# Patient Record
Sex: MALE | Race: WHITE
[De-identification: names, ages, dates, MRNs, and addresses within clinical notes are randomized per-mention and may not be internally consistent; named-entity substitution may affect disease eponyms.]

---

## 2017-01-04 LAB
% IMMATURE GRANULYOCYTES: 0.7 % (ref 0–1.1)
ABSOLUTE IMMATURE GRANULOCYTES: 0.07 10^3/UL (ref 0–0.1)
ABSOLUTE NRBC COUNT: 0 10^3/UL (ref 0–0.01)
ADD DIFF?: NO
ADD MORPH?: NO
ADD SCAN?: NO
ANION GAP SERPL CALC-SCNC: 13 MEQ/L (ref 8–16)
ATYPICAL LYMPHOCYTE FLAG: 0 (ref 0–99)
CALCIUM SERPL-MCNC: 10.2 MG/DL (ref 8.5–10.4)
CHLORIDE SERPL-SCNC: 99 MEQ/L (ref 97–110)
CO2 SERPL-SCNC: 27 MEQ/L (ref 22–31)
CREAT SERPL-MCNC: 0.7 MG/DL (ref 0.7–1.3)
ERYTHROCYTE [DISTWIDTH] IN BLOOD BY AUTOMATED COUNT: 12.9 % (ref 11.5–15.2)
FRAGMENT RBC FLAG: 0 (ref 0–99)
GFR SERPL CREATININE-BSD FRML MDRD: > 60 ML/MIN/{1.73_M2}
GLUCOSE SERPL-MCNC: 106 MG/DL (ref 70–100)
HCT VFR BLD CALC: 50 % (ref 40–51)
HGB BLD-MCNC: 18.3 G/DL (ref 13.7–17.5)
LEFT SHIFT FLG: 0 (ref 0–99)
LIPEMIA HEMOLYSIS FLAG: 90 (ref 0–99)
MCH RBC BLDCO QN: 35.2 PG (ref 27.9–34.1)
MCHC RBC AUTO-ENTMCNC: 36.6 G/DL (ref 32.4–36.7)
MCV RBC AUTO: 96.2 FL (ref 81.5–99.8)
NRBC-AUTO%: 0 % (ref 0–0.2)
PLATELET # BLD: 195 10^3/UL (ref 150–400)
PLATELET CLUMPS FLAG: 10 (ref 0–99)
PMV BLD AUTO: 9.8 FL (ref 8.7–11.7)
POTASSIUM SERPL-SCNC: 3.4 MEQ/L (ref 3.5–5.2)
RBC # BLD AUTO: 5.2 10^6/UL (ref 4.4–6.38)
SODIUM SERPL-SCNC: 139 MEQ/L (ref 134–144)

## 2017-01-05 ENCOUNTER — HOSPITAL ENCOUNTER (OUTPATIENT)
Dept: HOSPITAL 80 - FIMAGING | Age: 57
End: 2017-01-05
Attending: ORTHOPAEDIC SURGERY
Payer: COMMERCIAL

## 2017-01-05 DIAGNOSIS — Z01.818: Primary | ICD-10-CM

## 2017-01-05 DIAGNOSIS — M17.9: ICD-10-CM

## 2017-01-05 NOTE — CT
CT Left Lower Extremity With Attention to the Knee

 

Indication: Pain. Preoperative evaluation for left total knee replacement.

 

Technique: Spiral imaging was obtained through the left knee at 1.25 mm slice thickness along with sp
iral imaging through the left hip and left ankle at 2.5 mm slice thickness. Data was transmitted for 
subsequent SLIME knee replacement.  CT dose reduction techniques utilized.

 

Findings: 

 

Left Hip: Mild osteoarthritic changes.

 

Left Knee: Severe medial joint space narrowing with bone on bone contact and osteophytes. Moderate la
teral joint space narrowing and moderate patellofemoral osteoarthritic changes. There is a Baker cyst
 posteriorly.

 

Left Ankle:  Mild osteoarthritic changes.

 

Impression:

1. Severe medial joint space narrowing left knee and bone on bone changes. Moderate lateral joint spa
ce narrowing left knee and patellofemoral osteoarthritic changes.

2. Data was transmitted for SLIME knee replacement surgery.

## 2017-01-20 ENCOUNTER — HOSPITAL ENCOUNTER (INPATIENT)
Dept: HOSPITAL 80 - F3N | Age: 57
LOS: 1 days | Discharge: HOME | DRG: 470 | End: 2017-01-21
Attending: ORTHOPAEDIC SURGERY | Admitting: ORTHOPAEDIC SURGERY
Payer: COMMERCIAL

## 2017-01-20 DIAGNOSIS — F17.210: ICD-10-CM

## 2017-01-20 DIAGNOSIS — M17.12: Primary | ICD-10-CM

## 2017-01-20 LAB
EST. AVERAGE GLUCOSE BLD GHB EST-MCNC: 123 MG/DL (ref 68–126)
HBA1C MFR BLD: 5.9 % (ref 4–6)

## 2017-01-20 PROCEDURE — 0SRD0J9 REPLACEMENT OF LEFT KNEE JOINT WITH SYNTHETIC SUBSTITUTE, CEMENTED, OPEN APPROACH: ICD-10-PCS | Performed by: ORTHOPAEDIC SURGERY

## 2017-01-20 PROCEDURE — 8E0Y0CZ ROBOTIC ASSISTED PROCEDURE OF LOWER EXTREMITY, OPEN APPROACH: ICD-10-PCS | Performed by: ORTHOPAEDIC SURGERY

## 2017-01-20 PROCEDURE — C1713 ANCHOR/SCREW BN/BN,TIS/BN: HCPCS

## 2017-01-20 RX ADMIN — FAMOTIDINE SCH MG: 20 TABLET, FILM COATED ORAL at 20:07

## 2017-01-20 RX ADMIN — ACETAMINOPHEN SCH MG: 325 TABLET ORAL at 18:27

## 2017-01-20 RX ADMIN — ASPIRIN SCH MG: 325 TABLET, FILM COATED ORAL at 20:07

## 2017-01-20 RX ADMIN — HYOSCYAMINE SULFATE SCH MG: 0.38 TABLET, EXTENDED RELEASE ORAL at 20:07

## 2017-01-20 RX ADMIN — OXYCODONE HYDROCHLORIDE PRN MG: 15 TABLET ORAL at 15:12

## 2017-01-20 RX ADMIN — ACETAMINOPHEN SCH MG: 325 TABLET ORAL at 23:46

## 2017-01-20 RX ADMIN — DOCUSATE SODIUM AND SENNOSIDES SCH TAB: 50; 8.6 TABLET ORAL at 20:07

## 2017-01-20 RX ADMIN — GABAPENTIN SCH MG: 300 CAPSULE ORAL at 20:07

## 2017-01-20 RX ADMIN — OXYCODONE HYDROCHLORIDE PRN MG: 15 TABLET ORAL at 20:08

## 2017-01-20 RX ADMIN — OXYCODONE HYDROCHLORIDE PRN MG: 15 TABLET ORAL at 23:48

## 2017-01-20 RX ADMIN — Medication SCH MLS: at 18:28

## 2017-01-20 NOTE — DX
Left knee, 2 views.



HISTORY: left knee pain. Status post left knee arthroplasty. Post operative evaluation.



FINDINGS: Postsurgical changes as seen of a left total knee arthroplasty. There is good alignment in 
appearance. No evidence for periprosthetic lucency or fracture. There is appropriate soft tissue worrell
ge for the immediate postsurgical appearance.



IMPRESSION: Post surgical changes of left total knee arthroplasty with good alignment and appearance.

## 2017-01-21 VITALS — HEART RATE: 60 BPM | DIASTOLIC BLOOD PRESSURE: 77 MMHG | RESPIRATION RATE: 15 BRPM | SYSTOLIC BLOOD PRESSURE: 117 MMHG

## 2017-01-21 VITALS — OXYGEN SATURATION: 92 %

## 2017-01-21 VITALS — TEMPERATURE: 97.8 F

## 2017-01-21 LAB
ANION GAP SERPL CALC-SCNC: 5 MEQ/L (ref 8–16)
CALCIUM SERPL-MCNC: 8.4 MG/DL (ref 8.5–10.4)
CHLORIDE SERPL-SCNC: 105 MEQ/L (ref 97–110)
CO2 SERPL-SCNC: 27 MEQ/L (ref 22–31)
CREAT SERPL-MCNC: 0.6 MG/DL (ref 0.7–1.3)
GFR SERPL CREATININE-BSD FRML MDRD: > 60 ML/MIN/{1.73_M2}
GLUCOSE SERPL-MCNC: 126 MG/DL (ref 70–100)
HCT VFR BLD CALC: 41.9 % (ref 40–51)
HGB BLD-MCNC: 15 G/DL (ref 13.7–17.5)
POTASSIUM SERPL-SCNC: 4.1 MEQ/L (ref 3.5–5.2)
SODIUM SERPL-SCNC: 137 MEQ/L (ref 134–144)

## 2017-01-21 RX ADMIN — HYOSCYAMINE SULFATE SCH MG: 0.38 TABLET, EXTENDED RELEASE ORAL at 08:45

## 2017-01-21 RX ADMIN — DOCUSATE SODIUM AND SENNOSIDES SCH: 50; 8.6 TABLET ORAL at 08:46

## 2017-01-21 RX ADMIN — FAMOTIDINE SCH MG: 20 TABLET, FILM COATED ORAL at 08:43

## 2017-01-21 RX ADMIN — ATORVASTATIN CALCIUM SCH: 20 TABLET, FILM COATED ORAL at 08:52

## 2017-01-21 RX ADMIN — OXYCODONE HYDROCHLORIDE PRN MG: 15 TABLET ORAL at 08:46

## 2017-01-21 RX ADMIN — OXYCODONE HYDROCHLORIDE PRN MG: 15 TABLET ORAL at 05:11

## 2017-01-21 RX ADMIN — ATORVASTATIN CALCIUM SCH MG: 20 TABLET, FILM COATED ORAL at 08:42

## 2017-01-21 RX ADMIN — Medication SCH MLS: at 02:04

## 2017-01-21 RX ADMIN — GABAPENTIN SCH MG: 300 CAPSULE ORAL at 08:42

## 2017-01-21 RX ADMIN — ACETAMINOPHEN SCH MG: 325 TABLET ORAL at 05:11

## 2017-01-21 RX ADMIN — ASPIRIN SCH MG: 325 TABLET, FILM COATED ORAL at 08:43

## 2017-01-21 NOTE — GOP
[f 
rep st]



                                                                OPERATIVE REPORT





DATE OF OPERATION:  01/20/2017



SURGEON:  ANSHU Bull MD



ASSISTANT:  DEBBIE Gauthier



ANESTHESIA:  Spinal.



PREOPERATIVE DIAGNOSIS:  Left knee osteoarthritis.



POSTOPERATIVE DIAGNOSIS:  Left knee osteoarthritis.



PROCEDURE PERFORMED:  Left total knee replacement with computer navigation with 
robotic assist.



FINDINGS:  Severe medial osteoarthritis.  Preop flexion contracture of 5 
degrees.  



ESTIMATED BLOOD LOSS:  30 cc



INDICATIONS:  This is a 57-year-old male with severe and progressive pain and 
deformity of the left knee unresponsive to conservative care.  Risks and 
benefits of the surgical intervention were explained in detail.



DESCRIPTION OF PROCEDURE:  The patient was brought to the operative room and 
placed on the table in the supine position. Spinal anesthesia was induced 
without difficulty. A pneumatic tourniquet was applied about the left proximal 
thigh, and the leg was prepped and draped in a sterile fashion. The leg tarango 
was applied. After exsanguination by elevation the tourniquet was inflated to 
275 mm of mercury. 



Incision was made anterior medial from the tibial tuberosity to a point 2 cm 
proximal to the superior pole of the patella. Medial parapatellar arthrotomy 
was carried out from the superior pole of the patella and posteriorly in line 
with the fibers of the Type II VMO. The medial collateral ligament was elevated 
and the infrapatellar fat pad was resected. 



The patella was everted and the articular surface was excised. A 35 mm patellar 
button was placed.  



 Attention was turned first to the distal aspect of the left femur.  At 3 cm 
proximal to the medial rise of the femur, 2 percutaneous half pins were placed 
for fixation of the femoral array.  In a similar fashion, 2 pins were placed 
anteromedial on the tibia for fixation of the tibial array.  External land 
marking and registration of the hip center was performed without difficulty.   
Internal femoral and tibial registration was carried out without difficulty and 
the femoral and tibial checkpoints were placed and verified for accuracy. 



Attention was turned to the femur.  The foot print for the size 5 femoral 
component was cut with the saw using the [employer] robotic system and verified for 
accuracy against the CT based plan.   In a similar fashion, saw was used to cut 
the footprint for the size 6 tibial component using the [employer] system and verified 
for accuracy against the CT based plan. 



The tibial articular surface was excised without difficulty, followed by the 
intercondylar box cut. 



The knee was extended and the remnants of the medial and lateral meniscus were 
excised. The posterior capsule was injected with ropivacaine, epinephrine and 
Toradol. A size 6 MIS mini-keel tibial tray was positioned. Trial reduction was 
then carried out. There was excellent range of motion, alignment, and stability 
using the 9 mm polyethylene. 



All trials were then removed. The joint was thoroughly irrigated and carefully 
dried. Two packages of cement and 2 grams of vancomycin were mixed in the 
vacuum mixer and placed on the fixation surfaces of all surfaces of the 
components. The components were implanted and all excess cement was thoroughly 
removed. The permanent 9 mm polyethylene was placed without difficulty. 



The tourniquet was deflated and all bleeders were coagulated. The wound was 
thoroughly irrigated and closed using interrupted sutures of 2-0 Vicryl for the 
joint capsule. The subcu was closed with 3-0 Vicryl and the skin with 4-0 
Monocryl. Dermabond and Steri-Strips were applied followed by a compressive 
dressing. The patient was then moved from the operating room to the recovery 
room in good condition, having tolerated the procedure well.







Job #:  [id number]

MTDD

## 2017-01-21 NOTE — SOAPPROG
SOAP Progress Note


Assessment/Plan: 


Assessment:








Paolo is doing well POD 1 s/p L TKA





1. pain management: pain well controlled on oral pain medications.


2. VTE ppx: recommend  mg daily. cont NAFISA hose


3. Anemia: level is expected initially postop. asymptomatic.


4. d/c planning: d/c to home today.

















Plan:








01/21/17 10:19





Subjective: 


Daljit is doing well today, denies SOB, chest pain and N/V.


Objective: 





 Vital Signs











Temp Pulse Resp BP Pulse Ox


 


 36.6 C   60   15   117/77   92 


 


 01/21/17 04:59  01/21/17 07:31  01/21/17 07:31  01/21/17 07:31  01/21/17 08:47








 Laboratory Results





 01/21/17 04:40 





 01/21/17 04:40 





 











 01/20/17 01/21/17 01/22/17





 05:59 05:59 05:59


 


Intake Total  4560 


 


Output Total  450 


 


Balance  4110 








LLE: incision dressing is clean and dry, NVI, +pf/df





ICD10 Worksheet


Patient Problems: 


 Problems











Problem Status Diagnosed


 


Primary localized osteoarthritis of left knee Acute 


 


Alcohol dependence Active 


 


Diabetes mellitus type 2 Active 


 


Essential hypertension Active 


 


Hyperlipidemia Active

## 2017-01-26 NOTE — GDS
[f rep st]



                                                             DISCHARGE SUMMARY





ADMISSION DIAGNOSIS:  Left knee osteoarthritis.



DISCHARGE DIAGNOSIS:  Left knee osteoarthritis.



PROCEDURE:  Left total knee arthroplasty, robot assisted.



VTE PROPHYLAXIS:  Aspirin recommended for 3 weeks daily.



BRIEF DESCRIPTION OF HOSPITAL STAY:  Patient was admitted for an elective joint arthroplasty.  The pa
tient tolerated the procedure well and has passed physical therapy.  The patient was given appropriat
e antibiotic prophylaxis and venous thromboembolism prophylaxis.  The patient's pain was well control
led on oral pain medication, patient was holding down food, and had urinated.  Decision was made to d
ischarge the patient.  The patient was given post-operative prescriptions pre-operatively.



PLAN:  To follow up with Dr. Bull at [facility] for Orthopedics in 2 to 3 weeks.





Job #:  [id number]

## 2017-03-25 ENCOUNTER — HOSPITAL ENCOUNTER (INPATIENT)
Dept: HOSPITAL 80 - FED | Age: 57
LOS: 2 days | Discharge: HOME | DRG: 440 | End: 2017-03-27
Attending: FAMILY MEDICINE | Admitting: FAMILY MEDICINE
Payer: COMMERCIAL

## 2017-03-25 DIAGNOSIS — E78.5: ICD-10-CM

## 2017-03-25 DIAGNOSIS — E11.9: ICD-10-CM

## 2017-03-25 DIAGNOSIS — D75.1: ICD-10-CM

## 2017-03-25 DIAGNOSIS — Z72.0: ICD-10-CM

## 2017-03-25 DIAGNOSIS — I10: ICD-10-CM

## 2017-03-25 DIAGNOSIS — K85.20: Primary | ICD-10-CM

## 2017-03-25 DIAGNOSIS — G89.29: ICD-10-CM

## 2017-03-25 LAB
% IMMATURE GRANULYOCYTES: 0.9 % (ref 0–1.1)
ABSOLUTE IMMATURE GRANULOCYTES: 0.15 10^3/UL (ref 0–0.1)
ABSOLUTE NRBC COUNT: 0 10^3/UL (ref 0–0.01)
ADD DIFF?: NO
ADD MORPH?: NO
ADD SCAN?: NO
ALBUMIN SERPL-MCNC: 4.9 G/DL (ref 3.5–5)
ALP SERPL-CCNC: 83 IU/L (ref 38–126)
ALT SERPL-CCNC: 54 IU/L (ref 21–72)
ANION GAP SERPL CALC-SCNC: 23 MEQ/L (ref 8–16)
AST SERPL-CCNC: 60 IU/L (ref 17–59)
ATYPICAL LYMPHOCYTE FLAG: 0 (ref 0–99)
BILIRUB SERPL-MCNC: 1.5 MG/DL (ref 0.1–1.4)
BILIRUBIN-CONJUGATED: 0.7 MG/DL (ref 0–0.5)
BILIRUBIN-UNCONJUGATED: 0.8 MG/DL (ref 0–1.1)
CALCIUM SERPL-MCNC: 9.8 MG/DL (ref 8.5–10.4)
CHLORIDE SERPL-SCNC: 96 MEQ/L (ref 97–110)
CO2 SERPL-SCNC: 20 MEQ/L (ref 22–31)
CREAT SERPL-MCNC: 1.1 MG/DL (ref 0.7–1.3)
ERYTHROCYTE [DISTWIDTH] IN BLOOD BY AUTOMATED COUNT: 11.9 % (ref 11.5–15.2)
FRAGMENT RBC FLAG: 0 (ref 0–99)
GFR SERPL CREATININE-BSD FRML MDRD: > 60 ML/MIN/{1.73_M2}
GLUCOSE SERPL-MCNC: 166 MG/DL (ref 70–100)
HCT VFR BLD CALC: 53.8 % (ref 40–51)
HGB BLD-MCNC: 19.6 G/DL (ref 13.7–17.5)
LEFT SHIFT FLG: 0 (ref 0–99)
LIPEMIA HEMOLYSIS FLAG: 90 (ref 0–99)
MCH RBC BLDCO QN: 36.2 PG (ref 27.9–34.1)
MCHC RBC AUTO-ENTMCNC: 36.4 G/DL (ref 32.4–36.7)
MCV RBC AUTO: 99.4 FL (ref 81.5–99.8)
NRBC-AUTO%: 0 % (ref 0–0.2)
PLATELET # BLD: 167 10^3/UL (ref 150–400)
PLATELET CLUMPS FLAG: 0 (ref 0–99)
PMV BLD AUTO: 10.3 FL (ref 8.7–11.7)
POTASSIUM SERPL-SCNC: 3.5 MEQ/L (ref 3.5–5.2)
PROT SERPL-MCNC: 8 G/DL (ref 6.3–8.2)
RBC # BLD AUTO: 5.41 10^6/UL (ref 4.4–6.38)
SODIUM SERPL-SCNC: 139 MEQ/L (ref 134–144)

## 2017-03-25 RX ADMIN — OXYCODONE HYDROCHLORIDE SCH MG: 15 TABLET ORAL at 21:22

## 2017-03-25 RX ADMIN — PANTOPRAZOLE SODIUM SCH MLS: 40 INJECTION, POWDER, FOR SOLUTION INTRAVENOUS at 19:51

## 2017-03-25 RX ADMIN — SODIUM CHLORIDE SCH MLS: 900 INJECTION, SOLUTION INTRAVENOUS at 19:51

## 2017-03-25 RX ADMIN — HYDROMORPHONE HCL-SODIUM CHLORIDE 0.9% INJ 6 MG/30ML PRN MG: 0.2 SOLUTION at 21:04

## 2017-03-25 NOTE — EDPHY
H & P


Stated Complaint: etoh/hx pancreatitis/r upper abd pain/n/v


Time Seen by Provider: 03/25/17 16:04


HPI/ROS: 





CHIEF COMPLAINT:  Abdominal pain, vomiting. 





HISTORY OF PRESENT ILLNESS:  The patient is a 57-year-old male with a history 

of alcohol abuse and pancreatitis who presents with epigastric abdominal pain 

and vomiting x10 that began 7 hours ago. These symptoms are similar to his 

previous pancreatitis episodes. He denies alleviating or provoking factors. The 

pain does not radiate. He admits alcohol use last night and has been drinking 

heavily over the past month. He admits decreased PO intake. No fever, chills, 

chest pain, shortness of breath, palpitations, diarrhea, urinary complaints, 

headache, lightheadedness. His last episode of pancreatitis was 4 years ago. He 

took oxycodone for the pain but vomited it up. 





REVIEW OF SYSTEMS:


Aside from elements discussed in the HPI, a comprehensive 10-point review of 

systems was reviewed and is negative.





PAST MEDICAL HISTORY:   Diabetes, hypertension, hypercholesterolemia, depression

, chronic pain, pancreatitis, left knee replacement. 





SOCIAL HISTORY:  Alcohol abuse, tobacco use, no illicit drug use, sales 

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   





VITAL SIGNS: Reviewed by me


GENERAL: Well-developed, well-nourished, resting comfortably in no respiratory 

distress.


HEENT: Atraumatic. Eyes: No icterus, no injection. Mouth: dry mucous membranes.

  No erythema or lesions. Neck: supple with no adenopathy.


LUNGS: Clear to auscultation bilaterally, no wheezes, rhonchi or rales.


CARDIAC: Regular rate and rhythm, no rubs, murmurs or gallops.


ABDOMEN: Soft, bowel sounds normal, protuberant belly. Exquisite epigastric 

tenderness. No guarding or rebound. Hepatomegaly and splenomegaly.


BACK:  No CVA tenderness.


EXTREMITIES: No trauma. No edema.  Range of motion is normal throughout.


NEURO: Alert and oriented,  grossly nonfocal.  


SKIN: Warm and dry, no rash.


PSYCHIATRIC: Normal mentation, no agitation.





Portions of this note were transcribed by a medical scribe.  I personally 

performed a history, physical exam, medical decision making, and confirmed 

accuracy of information the transcribed note.





Source: Patient


Exam Limitations: No limitations





- Personal History


Current Tetanus/Diphtheria Vaccine: Yes





- Medical/Surgical History


Hx Asthma: No


Hx Chronic Respiratory Disease: No


Hx Diabetes: Yes


Hx Cardiac Disease: No


Hx Renal Disease: No


Hx Cirrhosis: No


Hx Alcoholism: Yes


Hx HIV/AIDS: No


Hx Splenectomy or Spleen Trauma: No


Other PMH: DM, HTN, high cholesterol, depression, chronic pain pancreatitis/etoh





- Social History


Smoking Status: Current every day smoker


Constitutional: 


 Initial Vital Signs











Temperature (C)  36.6 C   03/25/17 15:51


 


Heart Rate  81   03/25/17 15:51


 


Respiratory Rate  18   03/25/17 15:51


 


Blood Pressure  145/89 H  03/25/17 15:51


 


O2 Sat (%)  95   03/25/17 15:51








 











O2 Delivery Mode               Room Air














Allergies/Adverse Reactions: 


 





No Known Allergies Allergy (Verified 03/25/17 15:50)


 








Home Medications: 














 Medication  Instructions  Recorded


 


ARIPiprazole [Abilify 2 mg (*)] 2 mg PO DAILY 12/14/16


 


Ascorbic Acid [Vitamin C 500 mg 500 mg PO DAILY 12/14/16





(*)]  


 


Atorvastatin Calcium [Lipitor 20 20 mg PO DAILY 12/14/16





mg (*)]  


 


Canagliflozin [Invokana] 100 mg PO DAILY 12/14/16


 


Exenatide Microspheres [Bydureon 2 mg SQ TH 12/14/16





Pen]  


 


FLUoxetine [Prozac 20 MG (*)] 40 mg PO DAILY 12/14/16


 


Folic Acid [Folic Acid 1 MG (*)] 1 mg PO DAILY 12/14/16


 


Furosemide [Lasix 20 MG (*)] 20 mg PO DAILY 12/14/16


 


Herbals/Supplements -Info Only 1 ea PO DAILY 12/14/16


 


Hyoscyamine Sulfate [Hyomax-Sr 0.375 mg PO BID 12/14/16





0.375 mg (*)]  


 


Losartan/Hydrochlorothiazide 1 each PO DAILY 12/14/16





[Hyzaar 100-12.5 Tablet]  


 


OLANZapine [ZyPREXA 2.5 mg (*)] 5 mg PO HS 12/14/16


 


Vitamin B Complex [B Complex] 1 each PO DAILY 12/14/16


 


Insulin Detemir [Levemir] 40 unit SQ HS 03/25/17


 


oxyCODONE IR [Oxycodone Ir (*)] 10 mg PO TID 03/25/17














Medical Decision Making


ED Course/Re-evaluation: 





An IV was established and labs ordered. 1L IV saline administered for hydration

, along with 1mg IV Dilaudid for pain, and 4mg IV Zofran for nausea.





WBC elevated at 16.48. [place] [address]. Patient to be admitted for 

pancreatitis. 





1807: Consulted with Dr. Chirinos, hospitalist. He accepts admission. 


Differential Diagnosis: 





After obtaining the patient's history and performing an examination, 

differential diagnosis for this patient's epigastric pain was considered 

included but was not limited to biliary colic, cholecystitis, bowel obstruction

, peptic ulcer disease, pancreatitis, and gastroenteritis.


Consult/Admit Bed Type: Dr. Delonte Chirinos, med surg





- Data Points


Laboratory Results: 


 Laboratory Results





 03/25/17 16:00 





 03/25/17 16:00 





 











  03/25/17 03/25/17 03/25/17





  16:00 16:00 16:00


 


WBC      16.48 10^3/uL H 10^3/uL





     (3.80-9.50) 


 


RBC      5.41 10^6/uL 10^6/uL





     (4.40-6.38) 


 


Hgb      19.6 g/dL H g/dL





     (13.7-17.5) 


 


Hct      53.8 % H %





     (40.0-51.0) 


 


MCV      99.4 fL fL





     (81.5-99.8) 


 


MCH      36.2 pg H pg





     (27.9-34.1) 


 


MCHC      36.4 g/dL g/dL





     (32.4-36.7) 


 


RDW      11.9 % %





     (11.5-15.2) 


 


Plt Count      167 10^3/uL 10^3/uL





     (150-400) 


 


MPV      10.3 fL fL





     (8.7-11.7) 


 


Neut % (Auto)      90.5 % H %





     (39.3-74.2) 


 


Lymph % (Auto)      2.8 % L %





     (15.0-45.0) 


 


Mono % (Auto)      5.4 % %





     (4.5-13.0) 


 


Eos % (Auto)      0.0 % L %





     (0.6-7.6) 


 


Baso % (Auto)      0.4 % %





     (0.3-1.7) 


 


Nucleat RBC Rel Count      0.0 % %





     (0.0-0.2) 


 


Absolute Neuts (auto)      14.91 10^3/uL H 10^3/uL





     (1.70-6.50) 


 


Absolute Lymphs (auto)      0.46 10^3/uL L 10^3/uL





     (1.00-3.00) 


 


Absolute Monos (auto)      0.89 10^3/uL H 10^3/uL





     (0.30-0.80) 


 


Absolute Eos (auto)      0.00 10^3/uL L 10^3/uL





     (0.03-0.40) 


 


Absolute Basos (auto)      0.07 10^3/uL 10^3/uL





     (0.02-0.10) 


 


Absolute Nucleated RBC      0.00 10^3/uL 10^3/uL





     (0-0.01) 


 


Immature Gran %      0.9 % %





     (0.0-1.1) 


 


Immature Gran #      0.15 10^3/uL H 10^3/uL





     (0.00-0.10) 


 


Sodium    139 mEq/L mEq/L  





    (134-144)  


 


Potassium    3.5 mEq/L mEq/L  





    (3.5-5.2)  


 


Chloride    96 mEq/L L mEq/L  





    ()  


 


Carbon Dioxide    20 mEq/l L mEq/l  





    (22-31)  


 


Anion Gap    23 mEq/L H mEq/L  





    (8-16)  


 


BUN    28 mg/dL H mg/dL  





    (7-23)  


 


Creatinine    1.1 mg/dL mg/dL  





    (0.7-1.3)  


 


Estimated GFR    > 60   





    


 


Glucose    166 mg/dL H mg/dL  





    ()  


 


Calcium    9.8 mg/dL mg/dL  





    (8.5-10.4)  


 


Total Bilirubin  1.5 mg/dL H mg/dL    





   (0.1-1.4)   


 


Conjugated Bilirubin  0.7 mg/dL H mg/dL    





   (0.0-0.5)   


 


Unconjugated Bilirubin  0.8 mg/dL mg/dL    





   (0.0-1.1)   


 


AST  60 IU/L H IU/L    





   (17-59)   


 


ALT  54 IU/L IU/L    





   (21-72)   


 


Alkaline Phosphatase  83 IU/L IU/L    





   ()   


 


Total Protein  8.0 g/dL g/dL    





   (6.3-8.2)   


 


Albumin  4.9 g/dL g/dL    





   (3.5-5.0)   


 


Lipase  23358.0 IU/L H IU/L    





   ()   











Medications Given: 


 








Discontinued Medications





Hydromorphone HCl (Dilaudid)  1 mg IVP EDNOW ONE


   Stop: 03/25/17 16:28


   Last Admin: 03/25/17 16:39 Dose:  1 mg


Hydromorphone HCl (Dilaudid)  1 mg IVP EDNOW ONE


   Stop: 03/25/17 17:44


   Last Admin: 03/25/17 17:50 Dose:  1 mg


Hydromorphone HCl (Dilaudid)  0.25 - 0.5 mg IVP Q2HRS PRN


   PRN Reason: Pain, Severe Unable to Take PO


   Stop: 04/04/17 18:35


   Last Admin: 03/25/17 19:37 Dose:  0.5 mg


Sodium Chloride (Ns)  1,000 mls @ 0 mls/hr IV ONCE ONE


   PRN Reason: Wide Open


   Stop: 03/25/17 16:01


   Last Admin: 03/25/17 16:00 Dose:  1,000 mls


Sodium Chloride (Ns)  1,000 mls @ 0 mls/hr IV ONCE ONE


   PRN Reason: Wide Open


   Stop: 03/25/17 17:45


   Last Admin: 03/25/17 17:52 Dose:  1,000 mls


Ondansetron HCl (Zofran)  4 mg IVP EDNOW ONE


   Stop: 03/25/17 16:29


   Last Admin: 03/25/17 16:39 Dose:  4 mg


Ondansetron HCl (Zofran)  4 mg IVP EDNOW ONE


   Stop: 03/25/17 17:45


   Last Admin: 03/25/17 17:52 Dose:  4 mg








Departure





- Departure


Disposition: [facility]


Clinical Impression: 


Pancreatitis


Qualifiers:


 Chronicity: acute Pancreatitis type: alcohol induced Acute pancreatitis 

complication: unspecified Qualified Code(s): K85.20 - Alcohol induced acute 

pancreatitis without necrosis or infection





Abdominal pain


Qualifiers:


 Abdominal location: epigastric Qualified Code(s): R10.13 - Epigastric pain





Condition: Fair


Report Scribed for: Nivia Moore


Report Scribed by: Rafael Pérez


Date of Report: 03/25/17


Time of Report: 16:08

## 2017-03-26 LAB
% IMMATURE GRANULYOCYTES: 0.6 % (ref 0–1.1)
ABSOLUTE IMMATURE GRANULOCYTES: 0.08 10^3/UL (ref 0–0.1)
ABSOLUTE NRBC COUNT: 0 10^3/UL (ref 0–0.01)
ADD DIFF?: NO
ADD MORPH?: NO
ADD SCAN?: NO
ALBUMIN SERPL-MCNC: 3.6 G/DL (ref 3.5–5)
ALP SERPL-CCNC: 54 IU/L (ref 38–126)
ALT SERPL-CCNC: 47 IU/L (ref 21–72)
ANION GAP SERPL CALC-SCNC: 12 MEQ/L (ref 8–16)
AST SERPL-CCNC: 52 IU/L (ref 17–59)
ATYPICAL LYMPHOCYTE FLAG: 0 (ref 0–99)
BILIRUB SERPL-MCNC: 1.3 MG/DL (ref 0.1–1.4)
CALCIUM SERPL-MCNC: 8 MG/DL (ref 8.5–10.4)
CHLORIDE SERPL-SCNC: 107 MEQ/L (ref 97–110)
CO2 SERPL-SCNC: 22 MEQ/L (ref 22–31)
CREAT SERPL-MCNC: 0.7 MG/DL (ref 0.7–1.3)
ERYTHROCYTE [DISTWIDTH] IN BLOOD BY AUTOMATED COUNT: 11.8 % (ref 11.5–15.2)
FRAGMENT RBC FLAG: 0 (ref 0–99)
GFR SERPL CREATININE-BSD FRML MDRD: > 60 ML/MIN/{1.73_M2}
GLUCOSE SERPL-MCNC: 86 MG/DL (ref 70–100)
HCT VFR BLD CALC: 48.3 % (ref 40–51)
HGB BLD-MCNC: 16.9 G/DL (ref 13.7–17.5)
LEFT SHIFT FLG: 0 (ref 0–99)
LIPEMIA HEMOLYSIS FLAG: 90 (ref 0–99)
MCH RBC BLDCO QN: 35.5 PG (ref 27.9–34.1)
MCHC RBC AUTO-ENTMCNC: 35 G/DL (ref 32.4–36.7)
MCV RBC AUTO: 101.5 FL (ref 81.5–99.8)
NRBC-AUTO%: 0 % (ref 0–0.2)
PLATELET # BLD: 104 10^3/UL (ref 150–400)
PLATELET CLUMPS FLAG: 10 (ref 0–99)
PMV BLD AUTO: 10 FL (ref 8.7–11.7)
POTASSIUM SERPL-SCNC: 3.6 MEQ/L (ref 3.5–5.2)
PROT SERPL-MCNC: 6 G/DL (ref 6.3–8.2)
RBC # BLD AUTO: 4.76 10^6/UL (ref 4.4–6.38)
SODIUM SERPL-SCNC: 141 MEQ/L (ref 134–144)

## 2017-03-26 RX ADMIN — ENOXAPARIN SODIUM SCH MG: 100 INJECTION SUBCUTANEOUS at 08:13

## 2017-03-26 RX ADMIN — DEXTROSE MONOHYDRATE, SODIUM CHLORIDE, AND POTASSIUM CHLORIDE SCH MLS: 50; 4.5; 1.49 INJECTION, SOLUTION INTRAVENOUS at 09:55

## 2017-03-26 RX ADMIN — LOSARTAN POTASSIUM SCH MG: 50 TABLET, FILM COATED ORAL at 11:15

## 2017-03-26 RX ADMIN — SODIUM CHLORIDE SCH MLS: 900 INJECTION, SOLUTION INTRAVENOUS at 00:37

## 2017-03-26 RX ADMIN — INSULIN LISPRO SCH: 100 INJECTION, SOLUTION INTRAVENOUS; SUBCUTANEOUS at 12:15

## 2017-03-26 RX ADMIN — PANTOPRAZOLE SODIUM SCH MLS: 40 INJECTION, POWDER, FOR SOLUTION INTRAVENOUS at 08:14

## 2017-03-26 RX ADMIN — INSULIN LISPRO SCH: 100 INJECTION, SOLUTION INTRAVENOUS; SUBCUTANEOUS at 08:00

## 2017-03-26 RX ADMIN — DEXTROSE MONOHYDRATE, SODIUM CHLORIDE, AND POTASSIUM CHLORIDE SCH MLS: 50; 4.5; 1.49 INJECTION, SOLUTION INTRAVENOUS at 18:15

## 2017-03-26 RX ADMIN — HYDROMORPHONE HCL-SODIUM CHLORIDE 0.9% INJ 6 MG/30ML PRN MG: 0.2 SOLUTION at 09:55

## 2017-03-26 RX ADMIN — INSULIN LISPRO SCH: 100 INJECTION, SOLUTION INTRAVENOUS; SUBCUTANEOUS at 18:03

## 2017-03-26 RX ADMIN — OXYCODONE HYDROCHLORIDE SCH MG: 15 TABLET ORAL at 08:13

## 2017-03-26 RX ADMIN — OXYCODONE HYDROCHLORIDE SCH MG: 15 TABLET ORAL at 21:31

## 2017-03-26 RX ADMIN — OXYCODONE HYDROCHLORIDE SCH MG: 15 TABLET ORAL at 15:57

## 2017-03-26 RX ADMIN — HYDROCHLOROTHIAZIDE SCH MG: 12.5 CAPSULE ORAL at 11:16

## 2017-03-26 NOTE — HOSPPROG
Hospitalist Progress Note


Assessment/Plan: 





56 y/o male with h/o etoh abuse presents with





#acute pancreatitis (likely etoh related)


   -decrease ivf from 250ml/hr to d51/2 ns with 20meq kcl


   -cont pca


   -cont npo status given suspected ileus (diminished bowel sounds and absence 

of flatus)





#resolved polycythemia likely from hemoconcentration in the setting of 

dehydration 





#DM2 on insulin with episode of hypoglycemia while npo


   -he was given half of his normal dose of basal insulin last night


   -will hold lantus and cont with correctional insulin





#hypertension and tachycardia query etoh withdrawal


   -resume losartan 


   -start ciwa with librium and monitor for signs of worsening withdrawal





#tobacco use 


   -discussed tobacco cessation especially since it is another known risk for 

pancreatitis


Subjective: no flatus.  improving abd pain.  still feels like he needs the pca.

  no appetite.  no emesis


Objective: 


 Vital Signs











Temp Pulse Resp BP Pulse Ox


 


 37.2 C   97   15   142/87 H  92 


 


 03/26/17 05:48  03/26/17 05:48  03/26/17 05:48  03/26/17 05:48  03/26/17 05:48








 Laboratory Results





 03/26/17 04:45 





 03/26/17 04:45 





 











 03/25/17 03/26/17 03/27/17





 05:59 05:59 05:59


 


Output Total  1000 


 


Balance  -1000 














- Physical Exam


Constitutional: no apparent distress, appears nourished, not in pain


Cardiovascular: tachycardia, edema (trace lower leg), No systolic murmur, No JVD


Respiratory: no respiratory distress, no rales or rhonchi, clear to auscultation

, No inspiratory crackles, No rhonchi


Gastrointestinal: distension, other (soft with diminished bowel sounds), No 

guarding, No rebound


Genitourinary: no bladder fullness, no bladder tenderness, no renal bruits


Neurologic: AAOx3, sensation intact bilaterally


Psychiatric: interacting appropriately, not anxious, not encephalopathic, 

thought process linear





ICD10 Worksheet


Patient Problems: 


 Problems











Problem Status Onset


 


Diabetes mellitus type 2 Active  


 


Hyperlipidemia Active  


 


Essential hypertension Active  


 


Alcohol dependence Active  


 


Primary localized osteoarthritis of left knee Acute  


 


Pancreatitis Acute  


 


Abdominal pain Acute

## 2017-03-27 VITALS — OXYGEN SATURATION: 93 % | SYSTOLIC BLOOD PRESSURE: 126 MMHG | TEMPERATURE: 99.6 F | DIASTOLIC BLOOD PRESSURE: 78 MMHG

## 2017-03-27 VITALS — RESPIRATION RATE: 18 BRPM | HEART RATE: 104 BPM

## 2017-03-27 LAB
% IMMATURE GRANULYOCYTES: 1.3 % (ref 0–1.1)
ABSOLUTE IMMATURE GRANULOCYTES: 0.17 10^3/UL (ref 0–0.1)
ABSOLUTE NRBC COUNT: 0 10^3/UL (ref 0–0.01)
ADD DIFF?: NO
ADD MORPH?: NO
ADD SCAN?: NO
ALBUMIN SERPL-MCNC: 2.8 G/DL (ref 3.5–5)
ALP SERPL-CCNC: 50 IU/L (ref 38–126)
ALT SERPL-CCNC: 42 IU/L (ref 21–72)
ANION GAP SERPL CALC-SCNC: 9 MEQ/L (ref 8–16)
AST SERPL-CCNC: 43 IU/L (ref 17–59)
ATYPICAL LYMPHOCYTE FLAG: 0 (ref 0–99)
BILIRUB SERPL-MCNC: 1.6 MG/DL (ref 0.1–1.4)
CALCIUM SERPL-MCNC: 7.7 MG/DL (ref 8.5–10.4)
CHLORIDE SERPL-SCNC: 104 MEQ/L (ref 97–110)
CO2 SERPL-SCNC: 19 MEQ/L (ref 22–31)
CREAT SERPL-MCNC: 0.7 MG/DL (ref 0.7–1.3)
ERYTHROCYTE [DISTWIDTH] IN BLOOD BY AUTOMATED COUNT: 12 % (ref 11.5–15.2)
FRAGMENT RBC FLAG: 0 (ref 0–99)
GFR SERPL CREATININE-BSD FRML MDRD: > 60 ML/MIN/{1.73_M2}
GLUCOSE SERPL-MCNC: 114 MG/DL (ref 70–100)
HCT VFR BLD CALC: 45.4 % (ref 40–51)
HGB BLD-MCNC: 15.5 G/DL (ref 13.7–17.5)
LEFT SHIFT FLG: 10 (ref 0–99)
LIPEMIA HEMOLYSIS FLAG: 90 (ref 0–99)
MAGNESIUM SERPL-MCNC: 2.2 MG/DL (ref 1.6–2.3)
MCH RBC BLDCO QN: 35.3 PG (ref 27.9–34.1)
MCHC RBC AUTO-ENTMCNC: 34.1 G/DL (ref 32.4–36.7)
MCV RBC AUTO: 103.4 FL (ref 81.5–99.8)
NRBC-AUTO%: 0 % (ref 0–0.2)
PLATELET # BLD: 85 10^3/UL (ref 150–400)
PLATELET CLUMPS FLAG: 20 (ref 0–99)
PMV BLD AUTO: 10.3 FL (ref 8.7–11.7)
POTASSIUM SERPL-SCNC: 3.4 MEQ/L (ref 3.5–5.2)
PROT SERPL-MCNC: 5.3 G/DL (ref 6.3–8.2)
RBC # BLD AUTO: 4.39 10^6/UL (ref 4.4–6.38)
SODIUM SERPL-SCNC: 132 MEQ/L (ref 134–144)

## 2017-03-27 RX ADMIN — DEXTROSE MONOHYDRATE, SODIUM CHLORIDE, AND POTASSIUM CHLORIDE SCH MLS: 50; 4.5; 1.49 INJECTION, SOLUTION INTRAVENOUS at 10:35

## 2017-03-27 RX ADMIN — INSULIN LISPRO SCH: 100 INJECTION, SOLUTION INTRAVENOUS; SUBCUTANEOUS at 09:15

## 2017-03-27 RX ADMIN — LOSARTAN POTASSIUM SCH MG: 50 TABLET, FILM COATED ORAL at 09:15

## 2017-03-27 RX ADMIN — HYDROCHLOROTHIAZIDE SCH MG: 12.5 CAPSULE ORAL at 09:13

## 2017-03-27 RX ADMIN — DEXTROSE MONOHYDRATE, SODIUM CHLORIDE, AND POTASSIUM CHLORIDE SCH MLS: 50; 4.5; 1.49 INJECTION, SOLUTION INTRAVENOUS at 01:41

## 2017-03-27 RX ADMIN — OXYCODONE HYDROCHLORIDE SCH MG: 15 TABLET ORAL at 09:12

## 2017-03-27 RX ADMIN — INSULIN LISPRO SCH UNITS: 100 INJECTION, SOLUTION INTRAVENOUS; SUBCUTANEOUS at 12:45

## 2017-03-27 RX ADMIN — HYDROMORPHONE HCL-SODIUM CHLORIDE 0.9% INJ 6 MG/30ML PRN MG: 0.2 SOLUTION at 01:35

## 2017-03-27 RX ADMIN — ENOXAPARIN SODIUM SCH MG: 100 INJECTION SUBCUTANEOUS at 09:16

## 2017-03-27 RX ADMIN — PANTOPRAZOLE SODIUM SCH MLS: 40 INJECTION, POWDER, FOR SOLUTION INTRAVENOUS at 09:10

## 2017-03-27 NOTE — GDS
[f rep st]



                                                             DISCHARGE SUMMARY





DISCHARGE DIAGNOSES:  

1.  Acute pancreatitis, recurrent, likely secondary to alcohol. 

2.  Type 2 diabetes.  

3.  Hypertension. 

4.  Chronic pain on chronic narcotics.

5.  Dyslipidemia.

6.  Hypertension.



HOSPITAL COURSE:  Please see admission history and physical by Dr. Delonte Chirinos.  The patient presente
d with abdominal pain, previous episodes of pancreatitis.  He was noted to have an elevated lipase i
n the setting of heavy alcohol use.  The patient is managed with n.p.o. and IV fluids.  On the 2nd h
ospital day, his diet was advanced which he tolerated without increasing abdominal pain or nausea. 



His CIWA score was 2 at the time of discharge.



DISCHARGE MEDICATIONS:  He is discharged home on an unchanged medication regimen which is available 
in the electronic health record.





Job #:  [id number]

## 2017-03-27 NOTE — HOSPPROG
Hospitalist Progress Note


Assessment/Plan: 








58 y/o male with h/o etoh abuse a/w acute pancreatitis





acute pancreatitis (likely etoh related)


   ate


   dc IV pain meds and IVF


   trial of lunch





resolved polycythemia likely from hemoconcentration in the setting of 

dehydration 





DM2 on insulin with episode of hypoglycemia while npo


   -he was given half of his normal dose of basal insulin last night


   -will hold lantus and cont with correctional insulin





hypertension and tachycardia query etoh withdrawal


   -resume losartan 


   -start ciwa with librium and monitor for signs of worsening withdrawal





tachycardia: mild


   follow





tobacco use 


   -discussed tobacco cessation especially since it is another known risk for 

pancreatitis





dispo: home if tolerates lunch


   > 30 minutes on dc


Subjective: ate w minimal to no pain. hungry


Objective: 


 Vital Signs











Temp Pulse Resp BP Pulse Ox


 


 36.3 C   104 H  18   136/77 H  92 


 


 03/27/17 08:00  03/27/17 08:00  03/27/17 08:00  03/27/17 08:00  03/27/17 08:00








 Laboratory Results





 03/27/17 04:01 





 03/27/17 04:01 





 











 03/26/17 03/27/17 03/28/17





 05:59 05:59 05:59


 


Intake Total  3673 500


 


Output Total 1000 2600 200


 


Balance -1000 1073 300














- Physical Exam


Constitutional: no apparent distress, appears nourished


Eyes: PERRL, anicteric sclera


Ears, Nose, Mouth, Throat: moist mucous membranes, hearing normal


Cardiovascular: regular rate and rhythym, no murmur, rub, or gallop, tachycardia


Respiratory: no respiratory distress, no rales or rhonchi


Gastrointestinal: normoactive bowel sounds, soft, non-tender abdomen, No 

guarding, No rebound


Genitourinary: No pyle in urethra


Skin: warm, normal color


Musculoskeletal: full muscle strength


Neurologic: AAOx3





ICD10 Worksheet


Patient Problems: 


 Problems











Problem Status Onset


 


Abdominal pain Acute  


 


Pancreatitis Acute  


 


Alcohol dependence Active  


 


Diabetes mellitus type 2 Active  


 


Essential hypertension Active  


 


Hyperlipidemia Active  


 


Primary localized osteoarthritis of left knee Acute

## 2017-03-30 NOTE — EDPHY
H & P


Time Seen by Provider: 03/30/17 15:40


HPI/ROS: 


CHIEF COMPLAINT: Cough





HISTORY OF PRESENT ILLNESS: This patient is a 57 year old diabetic male who 

presents to the Emergency Department complaining of dry cough and shortness of 

breath beginning three days prior to arrival and worsening over time. He 

describes his cough as exacerbated with exertion and when supine. He also 

complains of low appetite, nausea, and diffuse body aches. He reports a 

subjective fever but denies chills. He did receive a flu shot in October. 

Medical history includes alcoholic pancreatitis for which he was recently 

admitted on 3/25 and discharged home on 3/27. He does describe mild lower 

abdominal pain but states that this is secondary to coughing and feels that it 

is dissimilar to his pancreatitis symptoms.





REVIEW OF SYSTEMS:


Constitutional: +subjective fever, no chills


Eyes: No visual changes


ENT: No sore throat


Respiratory: +cough, +shortness of breath


Cardiac: No chest pain


Gastrointestinal: +nausea, no vomiting, no abdominal pain


Genitourinary: No hematuria, no dysuria


Musculoskeletal: +bilateral leg swelling at baseline


Skin: No rash


Neurological: No headache, no numbness, no weakness


Psychiatric: No depression





Past Medical/Surgical History: 


Insulin-dependent diabetes, hypertension, hyperlipidemia, alcoholic 

pancreatitis.





PCP: Dr. Darci Oviedo


Social History: 


Smokes daily. Works at [employer].


Smoking Status: Current every day smoker


Physical Exam: 


General Appearance: Alert, non-toxic


Eyes: Pupils equal and round, no conjunctival pallor or injection


ENT, Mouth: Mucous membranes moist


Neck: Normal inspection


Respiratory: Lungs are clear to auscultation


Cardiovascular: Regular tachycardia


Gastrointestinal:  Abdomen is distended and non-tender 


Neurological:  A&O, nonfocal, normal gait


Skin:  Warm and dry, no rash


Extremities:  Nontender, 2+ pedal edema


Psychiatric: Mood and affect normal


Constitutional: 


 Initial Vital Signs











Temperature (C)  36.7 C   03/30/17 15:31


 


Heart Rate  111 H  03/30/17 15:31


 


Respiratory Rate  20   03/30/17 15:31


 


Blood Pressure  129/72 H  03/30/17 15:31


 


O2 Sat (%)  81 L  03/30/17 15:31








 











O2 Delivery Mode               Nasal Cannula


 


O2 (L/minute)                  4














Allergies/Adverse Reactions: 


 





No Known Allergies Allergy (Verified 03/25/17 15:50)


 








Home Medications: 














 Medication  Instructions  Recorded


 


ARIPiprazole [Abilify 2 mg (*)] 2 mg PO DAILY 12/14/16


 


Ascorbic Acid [Vitamin C 500 mg 500 mg PO DAILY 12/14/16





(*)]  


 


Atorvastatin Calcium [Lipitor 20 20 mg PO DAILY 12/14/16





mg (*)]  


 


Canagliflozin [Invokana] 100 mg PO DAILY 12/14/16


 


Exenatide Microspheres [Bydureon 2 mg SQ TH 12/14/16





Pen]  


 


FLUoxetine [Prozac 20 MG (*)] 40 mg PO DAILY 12/14/16


 


Folic Acid [Folic Acid 1 MG (*)] 1 mg PO DAILY 12/14/16


 


Furosemide [Lasix 20 MG (*)] 20 mg PO DAILY 12/14/16


 


Herbals/Supplements -Info Only 1 ea PO DAILY 12/14/16


 


Hyoscyamine Sulfate [Hyomax-Sr 0.375 mg PO BID 12/14/16





0.375 mg (*)]  


 


Losartan/Hydrochlorothiazide 1 each PO DAILY 12/14/16





[Hyzaar 100-12.5 Tablet]  


 


OLANZapine [ZyPREXA 2.5 mg (*)] 5 mg PO HS 12/14/16


 


Vitamin B Complex [B Complex] 1 each PO DAILY 12/14/16


 


Insulin Detemir [Levemir] 40 unit SQ HS 03/25/17


 


oxyCODONE IR [Oxycodone Ir (*)] 10 mg PO TID 03/25/17














Medical Decision Making





- Diagnostics


Imaging: 


Study: X-ray of the chest


Indication: Cough, shortness of breath, hypoxemia


Results: Chest x-ray was obtained. The results of the study are:


1. Suspect airways disease which is more prominent than on the previous study. 

Clinical correlation recommended to exclude pneumonia. 


2. Mild cardiac enlargement without pulmonary edema. 


The study was read by the radiologist, Dr. Joaquin Erwin. I viewed the images 

myself on the PACS system.








Study: CTA of the chest


Indication: Elevated d-dimer, hypoxemia


Results: CT angiogram of the chest was obtained. The results of the study are: 


1. Negative for pulmonary embolus. 


2. Suspect bilateral pneumonia. Pulmonary edema remains in the differential 

diagnosis. 


3. Cardiomegaly with coronary artery disease, but without plethoric pulmonary 

vessels or pleural effusion.


The study was read by the radiologist, Dr. David Oppenheimer. I viewed the 

images myself on the PACS system.





ED Course/Re-evaluation: 


57-year-old diabetic male presents with acute onset cough and shortness of 

breath with multiple associated complaints suspicious of infectious process 

including body aches, fatigue, nausea, and subjective fever. He is afebrile on 

arrival but tachycardic at 111 and hypoxemic at 81% on room air. Lungs are 

clear to auscultation on exam. Patient is placed on oxygen in the Emergency 

Department. IV established. Will proceed with chest x-ray and labs including 

flu screen and lactate.





Labs obtained: Notably, the patient is hypokalemic at 2.6 as compared to 3.4 on 

3/27. He does take Lasix without potassium supplementation. Lactate is within 

normal range at 1.4. WBC elevated at 10.87.





1645: IV and PO Potassium administered. No fluids will be administered at this 

time.





Chest x-ray demonstrates cardiomegaly. D-dimer, troponin, and BNP ordered. 

Chest x-ray is also suggestive of pneumonia versus airways disease; will 

administer DuoNeb.





Additional labs obtained: BNP elevated at 1210. Troponin is negative. D-dimer 

is elevated at 7.37. Will proceed with CTA of the chest.





1649: Consultation with Dr. Sienna Mills, hospitalist, who accepts admission 

to med/surg.  





1745: CT results reported to me by Dr. Oppenheimer, radiology. Echocardiogram 

ordered. Acute bronchitis/pneumonia more likely than pulmonary edema.  Dr. Mills to order Cefepime IV for HCAP.  Will proceed with admission.  


Differential Diagnosis: 





Differential diagnosis includes though it is not limited to pneumonia, 

pneumothorax, pulmonary embolism, aortic dissection, pericarditis, acute 

coronary syndrome.








- Data Points


Laboratory Results: 


 Laboratory Results





 03/30/17 16:00 





 03/30/17 16:00 





 











  03/30/17 03/30/17 03/30/17





  16:20 16:00 16:00


 


WBC      





    


 


RBC      





    


 


Hgb      





    


 


Hct      





    


 


MCV      





    


 


MCH      





    


 


MCHC      





    


 


RDW      





    


 


Plt Count      





    


 


MPV      





    


 


Neut % (Auto)      





    


 


Lymph % (Auto)      





    


 


Mono % (Auto)      





    


 


Eos % (Auto)      





    


 


Baso % (Auto)      





    


 


Nucleat RBC Rel Count      





    


 


Absolute Neuts (auto)      





    


 


Absolute Lymphs (auto)      





    


 


Absolute Monos (auto)      





    


 


Absolute Eos (auto)      





    


 


Absolute Basos (auto)      





    


 


Absolute Nucleated RBC      





    


 


Immature Gran %      





    


 


Immature Gran #      





    


 


D-Dimer    7.37 ug/mLFEU H ug/mLFEU  





    (0.00-0.50)  


 


VBG Lactic Acid      1.4 mmol/L mmol/L





     (0.7-2.1) 


 


Sodium      





    


 


Potassium      





    


 


Chloride      





    


 


Carbon Dioxide      





    


 


Anion Gap      





    


 


BUN      





    


 


Creatinine      





    


 


Estimated GFR      





    


 


Glucose      





    


 


Calcium      





    


 


Troponin I      





    


 


NT-Pro-B Natriuret Pep      





    


 


Influenza A & B (PCR)  NEGATIVE FOR FLU     





   (NEGATIVE)   














  03/30/17 03/30/17





  16:00 16:00


 


WBC    10.87 10^3/uL H 10^3/uL





    (3.80-9.50) 


 


RBC    4.54 10^6/uL 10^6/uL





    (4.40-6.38) 


 


Hgb    16.2 g/dL g/dL





    (13.7-17.5) 


 


Hct    44.3 % %





    (40.0-51.0) 


 


MCV    97.6 fL fL





    (81.5-99.8) 


 


MCH    35.7 pg H pg





    (27.9-34.1) 


 


MCHC    36.6 g/dL g/dL





    (32.4-36.7) 


 


RDW    12.0 % %





    (11.5-15.2) 


 


Plt Count    168 10^3/uL 10^3/uL





    (150-400) 


 


MPV    10.3 fL fL





    (8.7-11.7) 


 


Neut % (Auto)    84.1 % H %





    (39.3-74.2) 


 


Lymph % (Auto)    5.2 % L %





    (15.0-45.0) 


 


Mono % (Auto)    8.9 % %





    (4.5-13.0) 


 


Eos % (Auto)    0.0 % L %





    (0.6-7.6) 


 


Baso % (Auto)    0.6 % %





    (0.3-1.7) 


 


Nucleat RBC Rel Count    0.0 % %





    (0.0-0.2) 


 


Absolute Neuts (auto)    9.14 10^3/uL H 10^3/uL





    (1.70-6.50) 


 


Absolute Lymphs (auto)    0.56 10^3/uL L 10^3/uL





    (1.00-3.00) 


 


Absolute Monos (auto)    0.97 10^3/uL H 10^3/uL





    (0.30-0.80) 


 


Absolute Eos (auto)    0.00 10^3/uL L 10^3/uL





    (0.03-0.40) 


 


Absolute Basos (auto)    0.07 10^3/uL 10^3/uL





    (0.02-0.10) 


 


Absolute Nucleated RBC    0.00 10^3/uL 10^3/uL





    (0-0.01) 


 


Immature Gran %    1.2 % H %





    (0.0-1.1) 


 


Immature Gran #    0.13 10^3/uL H 10^3/uL





    (0.00-0.10) 


 


D-Dimer    





   


 


VBG Lactic Acid    





   


 


Sodium  136 mEq/L mEq/L  





   (134-144)  


 


Potassium  2.6 mEq/L L* mEq/L  





   (3.5-5.2)  


 


Chloride  99 mEq/L mEq/L  





   ()  


 


Carbon Dioxide  24 mEq/l mEq/l  





   (22-31)  


 


Anion Gap  13 mEq/L mEq/L  





   (8-16)  


 


BUN  21 mg/dL mg/dL  





   (7-23)  


 


Creatinine  0.8 mg/dL mg/dL  





   (0.7-1.3)  


 


Estimated GFR  > 60   





   


 


Glucose  176 mg/dL H mg/dL  





   ()  


 


Calcium  8.7 mg/dL mg/dL  





   (8.5-10.4)  


 


Troponin I  0.014 ng/mL ng/mL  





   (0-0.034)  


 


NT-Pro-B Natriuret Pep  1210 pg/mL H pg/mL  





   (0-125)  


 


Influenza A & B (PCR)    





   











Medications Given: 


 








Discontinued Medications





Albuterol/Ipratropium (Duoneb)  3 ml IH EDNOW ONE


   Stop: 03/30/17 16:45


   Last Admin: 03/30/17 17:32 Dose:  3 ml


Potassium Chloride (Potassium Cl 10 Meq (Premix))  50 mls @ 50 mls/hr IV EDNOW 

ONE


   Stop: 03/30/17 17:40


   Last Admin: 03/30/17 17:19 Dose:  100 mls


Potassium Chloride (Klor-Con)  20 meq PO EDNOW ONE


   Stop: 03/30/17 16:43


   Last Admin: 03/30/17 16:55 Dose:  20 meq








Departure





- Departure


Disposition: [facility]


Clinical Impression: 


 Hypokalemia, Hypoxemia





Pneumonia


Qualifiers:


 Pneumonia type: due to unspecified organism Laterality: bilateral Lung location

: unspecified part of lung Qualified Code(s): J18.9 - Pneumonia, unspecified 

organism





Condition: Fair


Report Scribed for: Betty Landin


Report Scribed by: Ivette Porter


Date of Report: 03/30/17


Time of Report: 16:02


Physician Review and Approval Statement: 





03/30/17 16:02


Portions of this note were transcribed by a medical scribe.  I personally 

performed a history, physical exam, medical decision making, and confirmed 

accuracy of information the transcribed note.

## 2017-03-30 NOTE — GHP
[f rep st]



                                                            HISTORY AND PHYSICAL





DATE OF ADMISSION:  03/30/2017



CHIEF COMPLAINT:  Shortness of breath.



HISTORY OF PRESENT ILLNESS:  A 57-year-old male with a history of alcohol-related pancreatitis who w
as hospitalized on 03/25/2017 to 03/27/2017 for medical management of pancreatitis.  Patient reports
 returning home and feeling well for approximately 24 hours when he developed myalgias, arthralgias,
 subjective fevers, chills, shortness of breath and a cough that is relenting and nonproductive.  Th
e patient had missed several days of work due to his preceding hospitalization; therefore, forced hi
mself to go to work, but found he was having difficulty completing even the simplest tasks at his [employer] at [employer].  He left work early today to return to the hospital for evaluation. 



In the emergency department he is endorsing severe shortness of breath, cough that is nonproductive,
 abdominal discomfort with his cough, but no clear pleuritic chest pain.  Denies hemoptysis.  Report
s myalgia, subjective fevers, mild headache.  No vision changes.  No diarrhea.  No dysuria.  No hema
turia or melena.  No lower extremity edema or new rashes.  No known sick contacts.



PAST MEDICAL HISTORY:  

1.  Alcohol-related pancreatitis.

2.  Type 2 diabetes.

3.  Hypertension.

4.  Chronic pain with continuous narcotic dependency.

5.  Hyperlipidemia.



SOCIAL HISTORY:  The patient smokes three-quarters of a pack cigarettes a day.  He said he has only 
had one drink since his discharge from the hospital.  Denies illicit drugs or marijuana.



FAMILY HISTORY:  Negative for lung disease.



ADVANCED DIRECTIVES:  Patient is full cor, full tube.  He would want his roommate to be his medical 
decision maker.



REVIEW OF SYSTEMS:  A 10-point review of systems is negative with the exception of that reported in 
the HPI.



PHYSICAL EXAMINATION:  VITAL SIGNS:  Blood pressure 129/72, heart rate 111, respiratory rate 20, 81%
 on room air at __________ .  GENERAL:  This is an obese male sitting up in bed.  HEENT:  Notable fo
r dry mucous membranes.  Eye exam is negative for any icterus.  CARDIAC:  Patient is tachycardic, bu
t regular.  PULMONARY:  The patient has adequate pulmonary effort.  No rales, rhonchi or wheezing ar
e appreciated.  GASTROINTESTINAL:  The patient is obese, has positive bowel sounds.  ABDOMEN:  Soft.
  He is nontender to palpation all 4 quadrants.  MUSCULOSKELETAL:  Negative for any lower extremity 
edema.  SKIN:  Exam is negative for any rashes.  NEUROLOGIC:  The patient has no asterixis.  Is aler
t and oriented x3.  Pleasant and cooperative on interview and examination.



DATA:  White count 10.8, hematocrit is 44.3, platelet count of 168, up from 85 at discharge.  Potass
ium is 2.6, creatinine 0.8, glucose 176.  Troponin 0.014.  BNP is 1210. 



Chest x-ray, which I personally reviewed and interpreted, shows no clear lobular infiltrate.  Radiol
bhargav comments on more prominent airways disease and cardiac enlargement without pulmonary edema.



ASSESSMENT AND PLAN:  This is a 57-year-old male presenting with shortness of breath.

1.  Acute hypoxic respiratory failure.  Based on the patient's clustering of symptoms, myalgias, sub
jective fevers, coughs, suspect likely an infectious etiology, probably atypical or viral on the fac
t that there was no lobular infiltrate on chest x-ray.  However, with recent hospitalization he is c
ertainly at risk for healthcare-associated pneumonia.  Will check influenza.  Check blood cultures. 
 Check a D-dimer to rule out possible pulmonary embolism.  Based on the results of these findings, c
onsider empiric antibiotic coverage likely for HCAP initially until we see his clinical response.

2.  Sinus tachycardia.  We will fluid resuscitate as I suspect it is likely related to his acute ill
ness; however, could be PE again.  Will follow D-dimer results.

3.  History of alcohol abuse.  Patient denies any heavy recent use since disposition.  Can monitor f
or withdrawal symptoms.

4.  Diabetes.  He is mildly hyperglycemic at presentation.  Again, may be acutely infected.  Will co
ana maría with sliding scale insulin and continue his home medications when reconciled.

5.  Hypokalemia.  Suspect related to poor oral intake post discharge.  Will aggressively resuscitate
.

6.  Hypertension.  If becoming septic, certainly would be at risk for drop in blood pressures.  Will
 monitor closely on home medications and fluid resuscitation.

7.  Hyperlipidemia.  We will continue his outpatient medications.  

8.  Prophylaxis with Lovenox. 

9.  Diet:  Diabetic.



DISPOSITION:  I expect greater than 2 midnights as the patient is presenting with a new medical comp
laint requiring diagnostics and treatment.  



I have discussed the case with the emergency room physician.  Patient will be triaged to the medical
-surgical floor for diagnostics and care.





Job #:  [id number]

## 2017-03-30 NOTE — CPEKG
Heart Rate: 89

RR Interval: 674

P-R Interval: 148

QRSD Interval: 100

QT Interval: 424

QTC Interval: 516

P Axis: 49

QRS Axis: 15

T Wave Axis: -13

EKG Severity - ABNORMAL ECG -

EKG Impression: SINUS RHYTHM

EKG Impression: NONSPECIFIC T ABNORMALITIES, INFERIOR LEADS

EKG Impression: PROLONGED QT INTERVAL

Electronically Signed By: Betty Landin 30-Mar-2017 20:55:29

## 2017-03-31 NOTE — HOSPPROG
Hospitalist Progress Note


Assessment/Plan: 





# acute hypoxic resp failure - much worse overnight; suspect d/t pna/COPD 

exacerbation


   - transfer to SDU


# COPD exacerbation


   - cont abx, steroids, BDs, mucinex


# HCAP - cont vanc/cefepime, agree with azith addition


   - check viral PCR and sputum cx


# etOH use - has significancy reduced recently; no e/o withdrawal currently


# recent etOH pancreatitis


# DM2 - cont glargine, follow on steroids


# chronic pain on continuous narcotics





##


45 minutes of floor critical care time spent with acute resp failure








Subjective: resp status much worse overnight


Objective: 


 Vital Signs











Temp Pulse Resp BP Pulse Ox


 


 36.6 C   82   36 H  145/85 H  94 


 


 03/31/17 07:21  03/31/17 07:21  03/31/17 08:26  03/31/17 07:21  03/31/17 08:26








 Laboratory Results





 03/31/17 04:17 





 03/31/17 04:17 





 











 03/30/17 03/31/17 04/01/17





 05:59 05:59 05:59


 


Intake Total  200 


 


Output Total   300


 


Balance  200 -300














- Physical Exam


Constitutional: uncomfortable


Cardiovascular: regular rate and rhythym, no murmur, rub, or gallop


Respiratory: reduced air movement, bronchial breath sounds (R base), 

respiratory distress (moderate), other (tachypneic; decreased bilat BS in bases)

, No expiratory wheeze


Gastrointestinal: normoactive bowel sounds, soft, non-tender abdomen, no 

palpable masses





ICD10 Worksheet


Patient Problems: 


 Problems











Problem Status Onset


 


Diabetes mellitus type 2 Active  


 


Hyperlipidemia Active  


 


Essential hypertension Active  


 


Alcohol dependence Active  


 


Primary localized osteoarthritis of left knee Acute  


 


Pancreatitis Acute  


 


Abdominal pain Acute  


 


Hypokalemia Acute  


 


Hypoxemia Acute  


 


Pneumonia Acute

## 2017-03-31 NOTE — ECHO
[id number]

[id number]



+---------------------------------------------------+      [address]

:                                                   :       [place] [address]

:                                                   :           [phone]

+---------------------------------------------------+       Fax [phone]



                       Adult Echocardiographic Report



+----------------------------------------------------------------------------

--------+

:Name: FELICIANO ST PStudy Date: 2017 09:57 AM                     

        :

:MRN: [id number]         Hospital Admission Number: [id number] Locat

ion: [id number]:

:: 1960         Gender: Male                           Height: 70 in

        :

:Age: 57 yrs             Race: WH                               Weight: 210 l

b       :

:Reason For Study: Eval LV Fx                                                

        :

:                                                               BSA: 2.1 mete

rs2     :

:History: Hypoxia                                                            

        :

+----------------------------------------------------------------------------

--------+

MMode/2D Measurements \T\ Calculations

IVSd: 0.98 cm  RVDd: 4.7 cm   FS: 45.6 %             Ao root diam: 3.3 cm

LVPWd: 1.1 cm  LVIDd: 5.3 cm  EDV(Teich): 136.8 ml   ACS: 1.9 cm

               LVIDs: 2.9 cm  ESV(Teich): 32.1 ml

                              EF(Teich): 76.6 %



Normal Measurement Values:

+----------------------------------------------------------------------------

----------------------------------+

:LVIDd (3.5-5.7cm)    IVSd (0.6-1.1cm)     LVPWd (0.6-1.1cm)     Aortic Root 

(2.0-3.7cm)Left Atrium (1.5-4.0cm):

:LV Vol(d) (76-115ml) LV Vol(s) (29-48ml)  Ejec Fraction (50-65%)PV Hitesh (0.6-

1.2m/s)    TV Hitesh (0.4-1.0m/s)    :

:MV E Hitesh (0.8-1.0m/s)MV A Hitesh (0.3-1.0m/s)LVOT Hitesh (0.7-1.2m/s) Asc Ao Hitesh (

0.9-1.8m/s)                       :

+----------------------------------------------------------------------------

----------------------------------+

Doppler Measurements \T\ Calculations

MV E max hitesh:       Ao V2 max:         LV V1 max:         PA V2 max:

84.9 cm/sec         159.9 cm/sec       102.7 cm/sec       89.0 cm/sec

MV A max hitesh:       Ao max PG:         LV V1 max PG:      PA max P.1 cm/sec         10.2 mmHg          4.2 mmHg           3.2 mmHg

MV E/A: 1.2

        _____________________________________________________________



TR max hitesh:

321.2 cm/sec

TR max P.3 mmHg

RAP systole:

5.0 mmHg

RVSP(TR): 46.3 mmHg



Left Ventricle

The left ventricle is normal in size. There is normal left ventricular wall

thickness. The left ventricular ejection fraction is normal. There is

Doppler evidence for diastolic dysfunction. Ejection Fraction = 77%. The

left ventricular wall motion is normal.





Right Ventricle

The right ventricle is mildly dilated.



Atria

The left atrial size is normal. Right atrial size is normal.





Mitral Valve

The mitral valve is normal in structure and function. There is no evidence

of mitral valve prolapse. There is no mitral valve stenosis. There is trace

mitral regurgitation.



Tricuspid Valve

Normal tricuspid valve. There is trace to mild tricuspid regurgitation.

Right ventricular systolic pressure is 47mmHg. There is Doppler evidence for

mild to moderate pulmonary hypertension.



Aortic Valve

The aortic valve is normal in structure and function. There is no aortic

stenosis. There is no aortic insufficiency.



Pulmonic Valve

The pulmonic valve is normal in structure and function. There is no pulmonic

valvular regurgitation.



Great Vessels

The aortic root is normal size.





Pericardium/Pleural

There is no pericardial effusion.



Conclusion

A complete two-dimensional transthoracic echocardiogram was performed (2D,

M-mode, Doppler and color flow Doppler).

The left ventricular ejection fraction is normal.

There is Doppler evidence for diastolic dysfunction.

Ejection Fraction = 77%.

The left ventricular wall motion is normal.

The mitral valve is normal in structure and function.

There is trace mitral regurgitation.

There is trace to mild tricuspid regurgitation.

Right ventricular systolic pressure is 47mmHg.

The aortic valve is normal in structure and function.

There is no pericardial effusion.

There is Doppler evidence for mild to moderate pulmonary hypertension.



_____________________________________________________________________________







Final Reading Physician:

                        Jena Holt signed on 2017 12:21 PM

Ordering Physician: FACUNDO GUPTA

Performed By: Yan Oliveira, CS

## 2017-03-31 NOTE — HOSPPROG
Hospitalist Progress Note


Assessment/Plan: 


  





Critical care spent with patient and nurse at bedside, addressing the following 

issues:





- sepsis screen triggered with worsening tachypnea and tachycardia as well as 

suspected multifocal pneumonia on chest CT without any evidence of pulmonary 

embolism


- oxygen requirements increased from 2 L to 6 L and respiratory rate currently 

40 breaths per minute, patient visibly sweaty, poor inspiratory air movement, 

poor expiratory air movement without overt wheezes or bronchial breath sounds, 

rhonchi present when patient is able to take a deep breath, patient is 

currently mentating well


- reviewed Dr. Mills's history and physical, patient's labs, patient's vital 

signs


- I suspect the patient has a significant multifocal pneumonia and he should be 

covered for atypical organisms, will initiate azithromycin IV, continue him on 

his previously dosed vancomycin and cefepime


- patient reports that he is a smoker and he is at risk for reactive airway 

process sees, unable to get a very good expiratory air movement exam, will 

empirically dose high-dose IV steroids and continue Solu-Medrol 60 mg q.6 hours 

IV as well as scheduled duo nebs


- ABG obtained, no evidence of carbon dioxide retention


- arterial lactate obtained, currently normal, troponin obtained, currently 

normal


- provide him with Tessalon Perles as needed


- send sputum culture, send urine Legionella, send urine strep


- monitor respiratory status closely and if his oxygen requirements continued 

to rise or he has worsening tachypnea, will consider high-flow Vapotherm versus 

BiPAP





Objective: 


 Vital Signs











Temp Pulse Resp BP Pulse Ox


 


 36.7 C   84   36 H  136/80 H  95 


 


 03/30/17 22:58  03/31/17 01:22  03/31/17 01:22  03/30/17 22:58  03/31/17 01:22














ICD10 Worksheet


Patient Problems: 


 Problems











Problem Status Onset


 


Diabetes mellitus type 2 Active  


 


Hyperlipidemia Active  


 


Essential hypertension Active  


 


Alcohol dependence Active  


 


Primary localized osteoarthritis of left knee Acute  


 


Pancreatitis Acute  


 


Abdominal pain Acute  


 


Hypokalemia Acute  


 


Hypoxemia Acute  


 


Pneumonia Acute

## 2017-04-01 NOTE — HOSPPROG
Hospitalist Progress Note


Assessment/Plan: 








# acute hypoxic resp failure - stable overnight, still very high O2 requirements


# COPD exacerbation


   - cont abx, steroids, BDs, mucinex


# HCAP - cont vanc (GPC in sputum)/cefepime, agree with azith addition


   - check viral PCR


   - recheck CXR tomorrow am


# etOH use - has significancy reduced recently; no e/o withdrawal currently


# recent etOH pancreatitis


# DM2 - cont glargine (increase dose), follow on steroids


# chronic pain on continuous narcotics





##


high risk





Subjective: still feels very SOB


Objective: 


 Vital Signs











Temp Pulse Resp BP Pulse Ox


 


 37 C   76   21 H  129/67 H  93 


 


 04/01/17 11:43  04/01/17 11:52  04/01/17 11:52  04/01/17 11:52  04/01/17 11:52








 Microbiology











 03/31/17 00:41  - Final





 Sputum, Expectorated 








 Laboratory Results





 04/01/17 05:35 





 04/01/17 05:35 





 











 03/31/17 04/01/17 04/02/17





 05:59 05:59 05:59


 


Intake Total 200 2537 


 


Output Total  2025 1225


 


Balance 200 512 -1225














- Physical Exam


Constitutional: uncomfortable


Cardiovascular: regular rate and rhythym, no murmur, rub, or gallop


Respiratory: other (tachypneic, L basilar rales, mod resp distress, no rhonchi)


Gastrointestinal: normoactive bowel sounds, soft, non-tender abdomen, no 

palpable masses





ICD10 Worksheet


Patient Problems: 


 Problems











Problem Status Onset


 


Diabetes mellitus type 2 Active  


 


Hyperlipidemia Active  


 


Essential hypertension Active  


 


Alcohol dependence Active  


 


Primary localized osteoarthritis of left knee Acute  


 


Pancreatitis Acute  


 


Abdominal pain Acute  


 


Hypokalemia Acute  


 


Hypoxemia Acute  


 


Pneumonia Acute

## 2017-04-01 NOTE — GCON
[f 
rep st]



                                                                    CONSULTATION





PULMONARY/CRITICAL CARE CONSULTATION



REFERRING PHYSICIAN:  Dov Ruff MD



REASON FOR REFERRAL:  Evaluation and management of pneumonia and hypoxemia.



HISTORY:  The patient is a 57-year-old male, who was recently admitted for 
alcohol related pancreatitis, discharged on 03/27/2017 after a 2-day 
hospitalization.  He felt well for about a day, but then developed some body 
aches, chills, fevers, shortness of breath and severe nonproductive cough.  
Because of this, he presented to the hospital 2 days ago, where he was started 
empirically on cefepime and vancomycin.  Then azithromycin was added.  He 
continues to have fairly high oxygen needs, but feels that his cough is getting 
a bit better.



PAST MEDICAL HISTORY:  

1.  Alcohol-related pancreatitis.  

2.  Type 2 diabetes.  

3.  Hypertension. 

4.  Chronic pain.



MEDICATIONS AT TIME OF ADMISSION:  Zyprexa, Lasix, Prozac, Lipitor, Invokana, 
Abilify, Hyzaar, Levemir and oxycodone.



ALLERGIES:  None.



SOCIAL HISTORY:  The patient smokes 3/4 of a pack of cigarettes daily.  He has 
essentially stopped drinking since his hospitalization.  He works at [employer].



FAMILY HISTORY:  Negative.



REVIEW OF SYSTEMS:  A 10-point review of systems adds nothing to the history of 
present illness.



PHYSICAL EXAMINATION:  GENERAL:  The patient is awake, alert, and in just mild 
respiratory distress with some tachypnea.  His blood pressure is 131/69, heart 
rate is 78, his respiratory rate is 25, his oxygen saturations are 92% on 9 L.  
HEENT:  Normocephalic and atraumatic.  No icterus.  NECK:  No JVD.  Trachea 
midline.  CHEST:  He has some basilar rales.  CARDIAC:  Regular rate and rhythm 
without murmur.  ABDOMEN:  Soft, nontender.  Bowel sounds are present.  
EXTREMITIES:  No clubbing, cyanosis, or edema.  NEURO:  The patient is awake, 
alert, and oriented with no focal motor weakness.



LABS:  White blood count is 16.5, up from 9.3 yesterday.  Hemoglobin is 15.3.  
A chemistry group is remarkable only for a glucose of 231.  Arterial blood gas 
yesterday shows a pH of 7.45 with a pO2 of 84, CO2 of 35, and a bicarbonate of 
25.  D-dimer is 7.4.  An influenza PCR is negative.  Legionella and strep 
pneumoniae antigens are pending.  A CT scan of the chest shows patchy alveolar 
infiltrates bilaterally affecting all lobes. Images reviewed. An angiogram was 
negative for pulmonary embolism.



ASSESSMENT:  

1.  Healthcare-associated pneumonia.  The patient presents with patchy 
infiltrates, fever and constitutional symptoms as well as cough and hypoxemia 
following recent hospitalization.  The CT scan appearance is most consistent 
with a viral or atypical pneumonia.  He continues to have fairly high oxygen 
needs.  He has been appropriately treated empirically with azithromycin, 
cefepime and vancomycin.  He was also just started on high-dose IV steroids.  
His PCR is negative for influenza, although his symptoms certainly sound like a 
viral pneumonia or influenza and this test could potentially be negative.  At 
this point, it would be a bit late to start Tamiflu, although given that he has 
been on steroids and he is immunosuppressed, there could still be some benefit.

2.  Recent pancreatitis.

3.  History of smoking.  The patient has no diagnosed history of COPD and does 
not have wheezing, but certainly have an obstructive component to his dyspnea 
and hypoxemia.

4.  Diabetes.  The patient's blood sugars are significantly elevated, likely 
due to steroids.



RECOMMENDATIONS:  

1.  Agree with checking a viral respiratory panel PCR.  

2.  Continue empiric antibiotics.

3.  Hold Tamiflu for now.

4.  Continue supplemental oxygen as well as bronchodilators.

5.  I will reduce the steroid dose, as he has already had a day of high-dose 
steroids and he has had marked increase in his blood sugars.  

6.  Continue to follow in the ICU for now.





Job #:  [id number]

MTDD

## 2017-04-02 NOTE — PDINTPN
Intensivist Progress Note


Assessment/Plan: 


Assessment:


Pneumonia: Patchy alvoeolar infiltrates most c/w viral/atypical pneumonia. 

Worse on CXR compared to several days ago and oxygen needs unchanged, but 

symptoms improved.


Hx Tobacco abuse: He may have a component of COPD, but has not been diagnosed.


DM: BSs high, 180-350, likely due to steroids.








Plan: Continue Cefipime, Azithromycin. Continue steroids at current reduced 

dose for now. May taper further if BSs still hard to manage and no signs of 

bronchospasm. Agree with respiratory viral panel, although this is unlikely to 

significantly .





04/02/17 13:25





Subjective: 





Cough and body aches better. Dyspnea a bit better.


Objective: 





 Vital Signs











Temp Pulse Resp BP Pulse Ox


 


 37.0 C   85   24 H  136/74 H  93 


 


 04/02/17 07:21  04/02/17 11:27  04/02/17 11:27  04/02/17 11:27  04/02/17 11:27








 Microbiology











 03/31/17 00:41  - Final





 Sputum, Expectorated Sputum Culture - Final








 Laboratory Results





 04/02/17 05:05 





 04/02/17 05:05 





 











 04/01/17 04/02/17 04/03/17





 05:59 05:59 05:59


 


Intake Total 2537 3394 400


 


Output Total 2025 2475 300


 


Balance 512 919 100








CXR: Increased bilateral alveolar infiltrates compared to 3/30. Images reviewed.





Physical Exam





- Physical Exam


General Appearance: alert, no apparent distress


EENT: normal ENT inspection


Neck: normal inspection


Respiratory: crackles


Cardiac/Chest: regular rate, rhythm, edema


Abdomen: normal bowel sounds, non-tender, soft


Skin: normal color, warm/dry


Extremities: normal inspection


Neuro/Psych: alert, normal mood/affect, oriented x 3





ICD10 Worksheet


Patient Problems: 


 Problems











Problem Status Onset


 


Hypokalemia Acute  


 


Hypoxemia Acute  


 


Pneumonia Acute  


 


Alcohol dependence Active  


 


Diabetes mellitus type 2 Active  


 


Essential hypertension Active  


 


Hyperlipidemia Active  


 


Abdominal pain Acute  


 


Pancreatitis Acute  


 


Primary localized osteoarthritis of left knee Acute

## 2017-04-02 NOTE — HOSPPROG
Hospitalist Progress Note


Assessment/Plan: 








# acute hypoxic resp failure - stable overnight, still very high O2 requirements

; very slow improvement


# COPD exacerbation


   - cont abx, steroids, BDs, mucinex


# HCAP - cont stop vanc, cont cefepime/azith


   - check viral PCR


# etOH use - has significancy reduced recently; no e/o withdrawal currently


# recent etOH pancreatitis


# DM2 - cont glargine (increase dose), follow on steroids


   - restarted home meds (Exanatide/invokana) today


# chronic pain on continuous narcotics





##


high risk





Subjective: feels better overall


Objective: 


 Vital Signs











Temp Pulse Resp BP Pulse Ox


 


 37.0 C   85   24 H  136/74 H  93 


 


 04/02/17 07:21  04/02/17 11:27  04/02/17 11:27  04/02/17 11:27  04/02/17 11:27








 Microbiology











 03/31/17 00:41  - Final





 Sputum, Expectorated Sputum Culture - Final








 Laboratory Results





 04/02/17 05:05 





 04/02/17 05:05 





 











 04/01/17 04/02/17 04/03/17





 05:59 05:59 05:59


 


Intake Total 2537 3394 400


 


Output Total 2025 2475 300


 


Balance 512 919 100














- Physical Exam


Constitutional: uncomfortable


Cardiovascular: regular rate and rhythym, no murmur, rub, or gallop


Respiratory: no rales or rhonchi, reduced air movement, expiratory wheeze (mild 

with coughing), respiratory distress (mod)


Gastrointestinal: normoactive bowel sounds, soft, non-tender abdomen, no 

palpable masses





ICD10 Worksheet


Patient Problems: 


 Problems











Problem Status Onset


 


Diabetes mellitus type 2 Active  


 


Hyperlipidemia Active  


 


Essential hypertension Active  


 


Alcohol dependence Active  


 


Primary localized osteoarthritis of left knee Acute  


 


Pancreatitis Acute  


 


Abdominal pain Acute  


 


Hypokalemia Acute  


 


Hypoxemia Acute  


 


Pneumonia Acute

## 2017-04-03 NOTE — HOSPPROG
Hospitalist Progress Note


Assessment/Plan: 








# acute hypoxic resp failure - stable overnight, still very high O2 requirements

; very slow improvement


# COPD exacerbation


   - cont abx, steroids (decrease dose), BDs, mucinex


# HCAP - cont stop vanc, cont cefepime/azith


   - check viral PCR - still not sent unfortunately


# etOH use - has significancy reduced recently; no e/o withdrawal currently


# recent etOH pancreatitis


# DM2 - cont glargine (increase dose), follow on steroids


   - restarted home meds (Exanatide/invokana) today


# chronic pain on continuous narcotics





##


mod risk





Subjective: breathing feels slightly improved


Objective: 


 Vital Signs











Temp Pulse Resp BP Pulse Ox


 


 36.6 C   94   20   150/85 H  92 


 


 04/03/17 11:25  04/03/17 11:25  04/03/17 11:25  04/03/17 11:25  04/03/17 11:25








 Microbiology











 03/31/17 00:41  - Final





 Sputum, Expectorated Sputum Culture - Final








 Laboratory Results





 04/03/17 03:45 





 04/03/17 03:45 





 











 04/02/17 04/03/17 04/04/17





 05:59 05:59 05:59


 


Intake Total 3394 1750 400


 


Output Total 2475 3400 400


 


Balance 919 -1650 0














- Physical Exam


Constitutional: no apparent distress, appears nourished


Cardiovascular: regular rate and rhythym, no murmur, rub, or gallop


Respiratory: no rales or rhonchi, clear to auscultation, respiratory distress (

mild)


Gastrointestinal: normoactive bowel sounds, soft, non-tender abdomen, no 

palpable masses





ICD10 Worksheet


Patient Problems: 


 Problems











Problem Status Onset


 


Diabetes mellitus type 2 Active  


 


Hyperlipidemia Active  


 


Essential hypertension Active  


 


Alcohol dependence Active  


 


Primary localized osteoarthritis of left knee Acute  


 


Pancreatitis Acute  


 


Abdominal pain Acute  


 


Hypokalemia Acute  


 


Hypoxemia Acute  


 


Pneumonia Acute

## 2017-04-04 NOTE — HOSPPROG
Hospitalist Progress Note


Assessment/Plan: 








# acute hypoxic resp failure - O2 demands improving; cont on abx and lasix IV


# COPD exacerbation


   - cont abx, steroids, BDs, mucinex, IS, flutter valve


# HCAP - cont cefepime D#5, azith D#4


# etOH use - has significancy reduced recently; no e/o withdrawal currently


# recent etOH pancreatitis


# DM2 - cont glargine (back to home dose), follow on steroids


   - restarted home meds (Exanatide/invokana) today


# chronic pain on continuous narcotics





# dispo - likely later this week





##


mod risk





Subjective: feels better, O2 lower


Objective: 


 Vital Signs











Temp Pulse Resp BP Pulse Ox


 


 36.9 C   85   20   114/77   94 


 


 04/04/17 11:25  04/04/17 11:25  04/04/17 11:25  04/04/17 11:25  04/04/17 11:25








 Laboratory Results





 04/03/17 03:45 





 04/04/17 03:37 





 











 04/03/17 04/04/17 04/05/17





 05:59 05:59 05:59


 


Intake Total 1750 2510 965


 


Output Total 3400 2250 1700


 


Balance -1650 260 -735














- Physical Exam


Constitutional: no apparent distress, appears nourished


Cardiovascular: regular rate and rhythym, no murmur, rub, or gallop


Respiratory: no respiratory distress, no rales or rhonchi, reduced air movement

, No inspiratory crackles


Gastrointestinal: normoactive bowel sounds, soft, non-tender abdomen, no 

palpable masses





ICD10 Worksheet


Patient Problems: 


 Problems











Problem Status Onset


 


Diabetes mellitus type 2 Active  


 


Hyperlipidemia Active  


 


Essential hypertension Active  


 


Alcohol dependence Active  


 


Primary localized osteoarthritis of left knee Acute  


 


Pancreatitis Acute  


 


Abdominal pain Acute  


 


Hypokalemia Acute  


 


Hypoxemia Acute  


 


Pneumonia Acute

## 2017-04-05 NOTE — HOSPPROG
Hospitalist Progress Note


Assessment/Plan: 





*  Acute respiratory failure - slow improvement


*  COPD exacerbation 


   -steroids, nebs, abx


*  Pneumonia


   -repeat CXR shows rapid resolution of infiltrates - ? pulmonary edema


   -influenza negative


   -continue cefepime, azithro - covering for possible HAP


*  DM II


   -home meds


*  Etoh abuse with recent Etoh pancreatitis


*  Tobacco dependence


*  Chronic pain with continuous narcotic dependency














Subjective: Feels very weak and fatigues - doesn't think ready for home.   Very 

SOB with exertion, had to go up on O2 with ambulation.   Productive severe 

cough continues, coughed up blood yesterday.


Objective: 


 Vital Signs











Temp Pulse Resp BP Pulse Ox


 


 37.2 C   96   20   123/69 H  95 


 


 04/05/17 07:21  04/05/17 10:29  04/05/17 10:29  04/05/17 07:21  04/05/17 10:29








 Laboratory Results





 04/03/17 03:45 





 04/05/17 09:15 





 











 04/04/17 04/05/17 04/06/17





 05:59 05:59 05:59


 


Intake Total 2510 2492 200


 


Output Total 2250 0996 425


 


Balance 530 -0983 -857








CXR viewed, my personal interpretation is - resolution of bilateral pulmonary 

infiltrates


ECHO - normal EF >70 % with pulmonary HTN





- Physical Exam


Constitutional: no apparent distress, appears nourished, not in pain


Cardiovascular: regular rate and rhythym, no murmur, rub, or gallop


Respiratory: no respiratory distress, no rales or rhonchi, clear to auscultation


Gastrointestinal: normoactive bowel sounds, soft, non-tender abdomen, no 

palpable masses


Skin: no rashes or abrasions, no fluctuance, no induration


Neurologic: AAOx3, sensation intact bilaterally


Psychiatric: interacting appropriately, not anxious, not encephalopathic, 

thought process linear





ICD10 Worksheet


Patient Problems: 


 Problems











Problem Status Onset


 


Hypokalemia Acute  


 


Hypoxemia Acute  


 


Pneumonia Acute  


 


Alcohol dependence Active  


 


Diabetes mellitus type 2 Active  


 


Essential hypertension Active  


 


Hyperlipidemia Active  


 


Abdominal pain Acute  


 


Pancreatitis Acute  


 


Primary localized osteoarthritis of left knee Acute

## 2017-04-06 NOTE — GDS
[f rep st]



                                                             DISCHARGE SUMMARY





DISCHARGE DIAGNOSES:  

1.  Acute respiratory failure.

2.  Chronic obstructive pulmonary disease exacerbation.

3.  Pneumonia.

4.  Diabetes type 2.

5.  Recent alcohol pancreatitis.

6.  Tobacco dependence.

7.  Chronic pain with continuous narcotic dependency.



HISTORY:  The patient is a 57-year-old male who presented with upper respiratory symptoms, and a obinna
st x-ray which blossomed bilateral pulmonary infiltrates most consistent with atypical pneumonia.  H
e was treated with antibiotics, steroids, and nebulizers, and slowly improved.  He is still requirin
g oxygen at the time of hospital discharge.  He was an active smoker until the time of presentation,
 but is motivated to quit.  His echocardiogram does show some right-sided heart failure and pulmonar
y hypertension.  So, there may be an element of chronic lung disease.  He is overweight with a BMI o
f 32, and that may also be contributing.  He is being referred to Pulmonary Medicine as an outpatien
t for full PFTs.  He will continue on oxygen continuously 24/7 until documentation of room-air satur
ations greater than 90%.



DISCHARGE MEDICATIONS:  Please see computer record for full detailed list.



NEW MEDICATIONS:  

1.  Combivent 1 puff 4 times a day.

2.  Azithromycin 500 mg p.o. daily for 5 more days.  He did complete a full 6-7 day course of cefepi
me while in the hospital.

3.  Prednisone 40 mg p.o. daily for 3 more days.



DISCHARGE INSTRUCTIONS:  Outpatient pulmonary function test to better diagnosis underlying chronic l
florentino disease and etiology of pulmonary hypertension.  Referral was given to Dr. Andrea Chapman. 



Greater 30 minutes of time was spent arranging this discharge.  The patient was seen and examined by
 me on the day of discharge.





Job #:  [id number]

## 2017-07-19 NOTE — POSTOPPROG
Post Op Note


Date of Operation: 07/19/17


Surgeon: JANNY Martinez


Assistant: pola martinez


Anesthesiologist: dr. lala


Anesthesia: Spinal, Other (Specify) (adductor canal block)


Pre-op Diagnosis: right knee OA


Post-op Diagnosis: same


Indication: right knee pain due to OA that failed conservative measures


Procedure: R TKA robot assisted


Findings: severe knee OA


Inf/Abcess present in the surg proc area at time of surgery?: No


EBL:

## 2017-07-19 NOTE — PDANEPAE
ANE History of Present Illness





RIGHT KNEE OA





ANE Past Medical History





- Cardiovascular History


Hx Hypertension: Yes


Hx Arrhythmias: No


Hx Chest Pain: No


Hx Coronary Artery / Peripheral Vascular Disease: No


Hx CHF / Valvular Disease: No


Hx Palpitations: No





- Pulmonary History


Hx COPD: No


Hx Asthma/Reactive Airway Disease: No


Hx Recent Upper Respiratory Infection: No


Hx Oxygen in Use at Home: No


Hx Sleep Apnea: No


Sleep Apnea Screening Result - Last Documented: Positive


Pulmonary History Comment: 3/30/17 ADMITTED FOR RESPIRATORY FAILURE. PULMONARY 

INFILTRATES/PNEUMONIA





- Neurologic History


Hx Cerebrovascular Accident: No


Hx Seizures: No


Hx Dementia: No





- Endocrine History


Hx Diabetes: Yes


Endocrine History Comment: IDDM





- Renal History


Hx Renal Disorders: No





- Liver History


Hx Hepatic Disorders: No


Hepatic History Comment: HAD HEP C, NOT ANYMORE





- Neurological & Psychiatric Hx


Hx Neurological and Psychiatric Disorders: Yes


Neurological / Psychiatric History Comment: DEPRESSION,





- Cancer History


Hx Cancer: No





- Congenital Disorder History


Hx Congenital Disorders: No





- GI History


Hx Gastrointestinal Disorders: No





- Other Health History


Other Health History: NONE





- Chronic Pain History


Chronic Pain: No





- Surgical History


Prior Surgeries: L SHOULDER





ANE Review of Systems





- Exercise capacity


METS (RN): 4 METS





ANE Patient History





- Allergies


Allergies/Adverse Reactions: 








No Known Allergies Allergy (Verified 03/25/17 15:50)


 








- Home Medications


Home Medications: 








ARIPiprazole [Abilify 2 mg (*)] 2 mg PO DAILY 12/14/16 [Last Taken 07/18/17 20:

00]


Ascorbic Acid [Vitamin C 500 mg (*)] 500 mg PO DAILY 12/14/16 [Last Taken 06/28/ 17]


Atorvastatin Calcium [Lipitor 20 mg (*)] 20 mg PO DAILY18 12/14/16 [Last Taken 

07/18/17 08:00]


Exenatide Microspheres [Bydureon Pen] 2 mg SQ TH 12/14/16 [Last Taken 07/13/17 

20:00]


FLUoxetine [Prozac 20 MG (*)] 40 mg PO DAILY 12/14/16 [Last Taken 07/19/17 08:00

]


Folic Acid [Folic Acid 1 MG (*)] 1 mg PO DAILY 12/14/16 [Last Taken 06/28/17]


Furosemide [Lasix 20 MG (*)] 20 mg PO DAILY 12/14/16 [Last Taken 07/18/17 08:00]


Herbals/Supplements -Info Only 1 ea PO DAILY 12/14/16 [Last Taken 06/28/17]


Hyoscyamine Sulfate [Hyomax-Sr 0.375 mg (*)] 0.375 mg PO BID 12/14/16 [Last 

Taken 07/18/17 08:00]


Losartan/Hydrochlorothiazide [Hyzaar 100-12.5 Tablet] 1 each PO DAILY 12/14/16 [

Last Taken 07/18/17 08:00]


OLANZapine [ZyPREXA 2.5 mg (*)] 5 mg PO HS 12/14/16 [Last Taken 07/18/17 20:00]


Vitamin B Complex [B Complex] 1 each PO DAILY 12/14/16 [Last Taken 06/28/17]


Insulin Detemir [Levemir] 40 unit SQ HS 03/25/17 [Last Taken 07/18/17 22:30]


oxyCODONE IR [Oxycodone Ir (*)] 10 mg PO TID 03/25/17 [Last Taken 07/19/17 07:30

]


Canagliflozin [Invokana] 300 mg PO DAILY 04/02/17 [Last Taken 07/18/17 08:00]


Diclofenac Sodium [Voltaren 75 MG (*)] 75 mg PO BIDMEAL 06/19/17 [Last Taken 

Unknown]


Vitamin B Complex [B Complex] 1 each PO DAILY 06/19/17 [Last Taken 06/28/17]


clonIDINE [Catapres (*)] 0.2 mg PO BID 06/19/17 [Last Taken Unknown]








- NPO status


NPO Since - Liquids (Date): 07/19/17


NPO Since - Liquids (Time): 07:30


NPO Since - Solids (Date): 07/18/17


NPO Since - Solids (Time): 23:00





- Anes Hx


Anes Hx: no prior problems





- Smoking Hx


Smoking Status: Current every day smoker





- Family Anes Hx


Family Hx Anesthesia Complications: NONE





ANE Labs/Vital Signs





- Vital Signs


Blood Pressure: 155/89


Heart Rate: 87


Respiratory Rate: 15


O2 Sat (%): 95


Height: 172.72 cm


Weight: 95.254 kg





ANE Physical Exam





- Airway


Neck exam: FROM


Mallampati Score: Class 2


Mouth exam: normal dental/mouth exam





- Pulmonary


Pulmonary: no respiratory distress





- Cardiovascular


Cardiovascular: regular rate and rhythym





- ASA Status


ASA Status: III





ANE Anesthesia Plan


Anesthesia Plan: spinal


Regional Anesthesia: adductor canal FNB

## 2017-07-20 NOTE — SOAPPROG
SOAP Progress Note


Assessment/Plan: 


Assessment:








Patient is doing well POD 1 s/p R TKA


Pain management: pain is well controlled on oral pain meds.


VTE ppx: recommend aspirin daily for 3 weeks, cont NAFISA and SCDs


Anemia: level is expected initially postop. Asymptomatic. Continue to monitor 


D/c planning: d/c to home today pending release from PT 

















Plan:





07/20/17 10:46





Subjective: 





Paolo is doing well today, eager for d/c states some numbness on his right 

foot and difficulty dorsiflexing, able to walk yesterday, mild pain, denies SOB 

,chest pain and N/V.


Objective: 





 Vital Signs











Temp Pulse Resp BP Pulse Ox


 


 36.6 C   61   16   133/82 H  94 


 


 07/20/17 07:28  07/20/17 07:28  07/20/17 07:28  07/20/17 07:28  07/20/17 07:28








 Laboratory Results





 07/20/17 05:08 





 











 07/19/17 07/20/17 07/21/17





 05:59 05:59 05:59


 


Intake Total  5543 


 


Output Total  1100 


 


Balance  4443 








RLE: incision dressing is clean and dry, NVI, +pf/df





ICD10 Worksheet


Patient Problems: 


 Problems











Problem Status Onset


 


Primary localized osteoarthritis of right knee Acute  


 


Alcohol dependence Active  


 


Diabetes mellitus type 2 Active  


 


Essential hypertension Active  


 


Hyperlipidemia Active

## 2017-07-20 NOTE — GOP
[f 
rep st]



                                                                OPERATIVE REPORT





DATE OF OPERATION:  07/19/2017



SURGEON:  ANSHU Bull MD



ASSISTANT:  DEBBIE Gauthier



ANESTHESIA:  Spinal.



PREOPERATIVE DIAGNOSIS:  Right knee osteoarthritis.



POSTOPERATIVE DIAGNOSIS:  Right knee osteoarthritis.



PROCEDURE PERFORMED:  Right total knee arthroplasty with computer navigation 
and robotic assist.



FINDINGS:  



ESTIMATED BLOOD LOSS:  30 cc.



INDICATIONS:  This is a 57-year-old male with severe and progressive pain and 
deformity of the right knee unresponsive to conservative care.  The risks and 
benefits of surgical intervention were explained in detail.



DESCRIPTION OF PROCEDURE:  The patient was brought to the operative room and 
placed on the table in the supine position.  Spinal anesthesia was induced 
without difficulty.  A pneumatic tourniquet was applied about the right 
proximal thigh, and the leg was prepped and draped in a sterile fashion.  The 
leg tarango was applied.   After exsanguination by elevation the tourniquet was 
inflated to _______ mm of mercury. 



Incision was made anterior medial from the tibial tuberosity to a point _______ 
cm proximal to the superior pole of the patella.   Medial parapatellar 
arthrotomy was carried out from the superior pole of the patella and 
posteriorly in line with the fibers of the Type II VMO.   The medial collateral 
ligament was elevated and the infrapatellar fat pad was resected. 



The patella was everted and the articular surface was excised.  A 35 mm 
patellar button was placed. The distal femoral guide hole was drilled and the 6 
degree alignment landon was placed.  A  mm distal femoral cut was made without 
difficulty. 



Attention was turned to the tibia and a standard _______ mm cut based on the ___
____ condyle was performed.  The tibial articular surface was excised without 
difficulty. 



Attention was turned back to the femur and a size 5 femoral cutting block was 
positioned.   Anterior, posterior, and chamfer cuts were made, followed by the 
intercondylar box cut. 



The knee was extended and the remnants of the medial and lateral meniscus were 
excised.  The posterior capsule was injected with ropivacaine, epinephrine, and 
Toradol.  A size 6 tibial tray was positioned.  Trial reduction was then 
carried out.   There was excellent range of motion, alignment, and stability 
using the 6 x 9 mm polyethylene. 



All trials were then removed.  The joint was thoroughly irrigated and carefully 
dried.  Two packages of cement and 2 grams of vancomycin were mixed in the 
vacuum mixer and placed on the fixation surfaces of all surfaces of the 
components.  The components were implanted and all excess cement was thoroughly 
removed.  The permanent 6 x 9 mm polyethylene was placed without difficulty.  



The tourniquet was deflated and all bleeders were coagulated.   The wound was 
thoroughly irrigated and closed using interrupted sutures of 2-0 Vicryl for the 
joint capsule.  The subcu was closed with 3-0 Vicryl and the skin with 4-0 
Monocryl.  Dermabond and Steri-Strips were applied followed by a compressive 
dressing.  The patient was then moved from the operating room to the recovery 
room in good condition, having tolerated the procedure well. 



Job #:  [id number]

MTDD

## 2017-07-21 NOTE — GDS
[f rep st]



                                                             DISCHARGE SUMMARY





ADMISSION DIAGNOSIS:  Right knee osteoarthritis.



DISCHARGE DIAGNOSIS:  Right knee osteoarthritis.



PROCEDURE:  Right total knee arthroplasty, robot-assisted.



VTE PROPHYLAXIS:  Aspirin recommended, 3 weeks daily.



BRIEF DESCRIPTION OF HOSPITAL STAY:  The patient was admitted for an elective joint arthroplasty.  T
he patient tolerated the procedure well and has passed physical therapy.  The patient was given appr
opriate antibiotic prophylaxis and venous thromboembolism prophylaxis.  The patient's pain was well 
controlled on oral pain medication.  The patient was holding down food and had urinated. 



The decision was made to discharge the patient.  The patient was given postoperative prescriptions p
reoperatively.



PLAN:  Follow up as scheduled, Dr. Bull's office.





Job #:  [id number]

## 2023-11-01 NOTE — POSTOPPROG
Post Op Note


Date of Operation: 01/20/17


Surgeon: JANNY Martinez


Assistant: pola martinez


Anesthesiologist: dr. kumar warm


Anesthesia: Spinal, Other (Specify) (adductor canal block)


Pre-op Diagnosis: left knee OA 


Post-op Diagnosis: same


Indication: left knee pain due to OA that failed conservative measures


Procedure: L medial knee arthroplasty, robot assisted


Findings: severe medial knee OA


Inf/Abcess present in the surg proc area at time of surgery?: No


EBL: 
No